# Patient Record
Sex: FEMALE | Employment: FULL TIME | URBAN - METROPOLITAN AREA
[De-identification: names, ages, dates, MRNs, and addresses within clinical notes are randomized per-mention and may not be internally consistent; named-entity substitution may affect disease eponyms.]

---

## 2017-04-14 ENCOUNTER — APPOINTMENT (OUTPATIENT)
Dept: LAB | Facility: HOSPITAL | Age: 47
End: 2017-04-14
Attending: INTERNAL MEDICINE
Payer: COMMERCIAL

## 2017-04-14 DIAGNOSIS — R94.6 ABNORMAL THYROID FUNCTION TEST: ICD-10-CM

## 2017-04-14 PROCEDURE — 84439 ASSAY OF FREE THYROXINE: CPT

## 2017-04-14 PROCEDURE — 84443 ASSAY THYROID STIM HORMONE: CPT

## 2017-04-14 PROCEDURE — 36415 COLL VENOUS BLD VENIPUNCTURE: CPT

## 2017-04-19 NOTE — PROGRESS NOTES
Quick Note:    Results have previously been released without comments/MD recommendations & reviewed by patient, therefore a letter was sent on this day.     No further SST action required.    ______

## 2017-05-05 PROBLEM — S63.641A SPRAIN OF METACARPOPHALANGEAL (MCP) JOINT OF RIGHT THUMB, INITIAL ENCOUNTER: Status: ACTIVE | Noted: 2017-05-05

## 2017-12-04 ENCOUNTER — HOSPITAL ENCOUNTER (OUTPATIENT)
Dept: MAMMOGRAPHY | Facility: HOSPITAL | Age: 47
Discharge: HOME | End: 2017-12-04
Attending: OBSTETRICS & GYNECOLOGY
Payer: COMMERCIAL

## 2017-12-04 DIAGNOSIS — Z12.39 BREAST CANCER SCREENING: ICD-10-CM

## 2018-02-16 PROCEDURE — 87624 HPV HI-RISK TYP POOLED RSLT: CPT | Performed by: OBSTETRICS & GYNECOLOGY

## 2018-02-16 PROCEDURE — 88175 CYTOPATH C/V AUTO FLUID REDO: CPT | Performed by: OBSTETRICS & GYNECOLOGY

## 2018-05-30 ENCOUNTER — LAB ENCOUNTER (OUTPATIENT)
Dept: LAB | Age: 48
End: 2018-05-30
Attending: INTERNAL MEDICINE
Payer: COMMERCIAL

## 2018-05-30 DIAGNOSIS — Z00.00 WELL ADULT EXAM: ICD-10-CM

## 2018-05-30 DIAGNOSIS — E03.9 ACQUIRED HYPOTHYROIDISM: ICD-10-CM

## 2018-05-30 DIAGNOSIS — E78.2 MIXED HYPERLIPIDEMIA: ICD-10-CM

## 2018-05-30 PROCEDURE — 84443 ASSAY THYROID STIM HORMONE: CPT

## 2018-05-30 PROCEDURE — 84439 ASSAY OF FREE THYROXINE: CPT

## 2018-05-30 PROCEDURE — 85025 COMPLETE CBC W/AUTO DIFF WBC: CPT

## 2018-05-30 PROCEDURE — 36415 COLL VENOUS BLD VENIPUNCTURE: CPT

## 2018-05-30 PROCEDURE — 80061 LIPID PANEL: CPT

## 2018-05-30 PROCEDURE — 82306 VITAMIN D 25 HYDROXY: CPT

## 2018-05-30 PROCEDURE — 80053 COMPREHEN METABOLIC PANEL: CPT

## 2018-10-09 ENCOUNTER — HOSPITAL ENCOUNTER (OUTPATIENT)
Dept: MAMMOGRAPHY | Facility: HOSPITAL | Age: 48
Discharge: HOME | End: 2018-10-09
Attending: INTERNAL MEDICINE
Payer: COMMERCIAL

## 2018-10-09 DIAGNOSIS — Z12.39 SCREENING FOR BREAST CANCER: ICD-10-CM

## 2018-10-09 PROCEDURE — 77067 SCR MAMMO BI INCL CAD: CPT | Performed by: OBSTETRICS & GYNECOLOGY

## 2018-10-09 PROCEDURE — 77063 BREAST TOMOSYNTHESIS BI: CPT | Performed by: OBSTETRICS & GYNECOLOGY

## 2018-10-22 ENCOUNTER — HOSPITAL ENCOUNTER (OUTPATIENT)
Dept: CT IMAGING | Facility: HOSPITAL | Age: 48
Discharge: HOME | End: 2018-10-22
Attending: INTERNAL MEDICINE

## 2018-10-22 DIAGNOSIS — Z13.6 SCREENING FOR CARDIOVASCULAR CONDITION: ICD-10-CM

## 2018-11-02 ENCOUNTER — HOSPITAL ENCOUNTER (OUTPATIENT)
Dept: MAMMOGRAPHY | Facility: HOSPITAL | Age: 48
Discharge: HOME | End: 2018-11-02
Attending: OBSTETRICS & GYNECOLOGY
Payer: COMMERCIAL

## 2018-11-02 DIAGNOSIS — R92.2 INCONCLUSIVE MAMMOGRAM: ICD-10-CM

## 2018-11-02 PROCEDURE — 77065 DX MAMMO INCL CAD UNI: CPT | Performed by: OBSTETRICS & GYNECOLOGY

## 2018-11-02 PROCEDURE — 76642 ULTRASOUND BREAST LIMITED: CPT | Performed by: OBSTETRICS & GYNECOLOGY

## 2018-11-02 PROCEDURE — 77061 BREAST TOMOSYNTHESIS UNI: CPT | Performed by: OBSTETRICS & GYNECOLOGY

## 2018-12-07 ENCOUNTER — LAB ENCOUNTER (OUTPATIENT)
Dept: LAB | Age: 48
End: 2018-12-07
Attending: INTERNAL MEDICINE
Payer: COMMERCIAL

## 2018-12-07 DIAGNOSIS — E03.9 HYPOTHYROIDISM, UNSPECIFIED TYPE: ICD-10-CM

## 2018-12-07 PROCEDURE — 36415 COLL VENOUS BLD VENIPUNCTURE: CPT

## 2018-12-07 PROCEDURE — 84443 ASSAY THYROID STIM HORMONE: CPT

## 2018-12-07 PROCEDURE — 84439 ASSAY OF FREE THYROXINE: CPT

## 2018-12-13 NOTE — PROGRESS NOTES
Left detailed message on VM- OK per hipaa- of provider comments below.   Lab already ordered  Rx already escribed to pharmacy

## 2018-12-21 ENCOUNTER — OFFICE VISIT (OUTPATIENT)
Dept: NEUROLOGY | Facility: CLINIC | Age: 48
End: 2018-12-21
Payer: COMMERCIAL

## 2018-12-21 VITALS
HEART RATE: 60 BPM | DIASTOLIC BLOOD PRESSURE: 76 MMHG | BODY MASS INDEX: 31 KG/M2 | SYSTOLIC BLOOD PRESSURE: 120 MMHG | RESPIRATION RATE: 16 BRPM | WEIGHT: 200 LBS

## 2018-12-21 DIAGNOSIS — M54.81 BILATERAL OCCIPITAL NEURALGIA: ICD-10-CM

## 2018-12-21 DIAGNOSIS — G43.709 CHRONIC MIGRAINE W/O AURA W/O STATUS MIGRAINOSUS, NOT INTRACTABLE: ICD-10-CM

## 2018-12-21 DIAGNOSIS — R51.9 NEW ONSET HEADACHE: Primary | ICD-10-CM

## 2018-12-21 PROCEDURE — 99204 OFFICE O/P NEW MOD 45 MIN: CPT | Performed by: OTHER

## 2018-12-21 RX ORDER — PROPRANOLOL HYDROCHLORIDE 80 MG/1
80 CAPSULE, EXTENDED RELEASE ORAL DAILY
Qty: 30 CAPSULE | Refills: 2 | Status: SHIPPED | OUTPATIENT
Start: 2018-12-21 | End: 2019-01-22

## 2018-12-21 NOTE — PROGRESS NOTES
HPI:    Patient ID: Arjun Yanez is a 53year old female. Patient is a 53year old female who presents for evaluation of headache. She has long standing history of migraines and gets them intermittently but they are gradually worsening.  Usually t quittin.9      Smokeless tobacco: Never Used      Tobacco comment: 15 pk yrs    Alcohol use: No      Alcohol/week: 0.0 oz      Comment: 1/mo    Drug use: No           Review of Systems   Constitutional: Negative. HENT: Negative.     Eyes: Positive f normal heart sounds. Pulmonary/Chest: Effort normal and breath sounds normal.   Abdominal: Soft. Bowel sounds are normal.   Skin: Skin is warm and dry. Psychiatric:  normal mood and affect.    Neurological   Awake, alert and oriented to time, place and questions. Thank you for allowing us to participate in your patient's care. Please do not hesitate to call if you have any questions.        Abena Rodriguez MD  [facility]          No orders of the defined types were placed in this

## 2018-12-21 NOTE — PROGRESS NOTES
Patient here for evaluation of migraines. Have been chronic for over 20 years. Have been progressively worse.

## 2019-01-03 ENCOUNTER — TELEPHONE (OUTPATIENT)
Dept: NEUROLOGY | Facility: CLINIC | Age: 49
End: 2019-01-03

## 2019-01-03 DIAGNOSIS — R51.9 NEW ONSET OF HEADACHES: Primary | ICD-10-CM

## 2019-01-03 DIAGNOSIS — M54.81 BILATERAL OCCIPITAL NEURALGIA: ICD-10-CM

## 2019-01-03 DIAGNOSIS — G43.709 CHRONIC MIGRAINE WITHOUT AURA WITHOUT STATUS MIGRAINOSUS, NOT INTRACTABLE: ICD-10-CM

## 2019-01-03 NOTE — TELEPHONE ENCOUNTER
Patient called STEFAN stating she could not complete MRI on 1/2/2019 due to fear of enclosed spaces and having her face covered. Patient requesting IV sedation for MRI. Patient denies wanting po medication for procedure.     Pended order for MRI with sedation

## 2019-01-12 ENCOUNTER — HOSPITAL ENCOUNTER (OUTPATIENT)
Age: 49
Discharge: HOME | End: 2019-01-12
Attending: EMERGENCY MEDICINE
Payer: COMMERCIAL

## 2019-01-12 VITALS
SYSTOLIC BLOOD PRESSURE: 123 MMHG | DIASTOLIC BLOOD PRESSURE: 82 MMHG | HEART RATE: 90 BPM | TEMPERATURE: 99 F | WEIGHT: 195 LBS | BODY MASS INDEX: 31 KG/M2 | RESPIRATION RATE: 16 BRPM | OXYGEN SATURATION: 99 %

## 2019-01-12 DIAGNOSIS — J06.9 VIRAL UPPER RESPIRATORY TRACT INFECTION: Primary | ICD-10-CM

## 2019-01-12 PROCEDURE — 99202 OFFICE O/P NEW SF 15 MIN: CPT

## 2019-01-12 PROCEDURE — 99212 OFFICE O/P EST SF 10 MIN: CPT

## 2019-01-12 RX ORDER — FLUTICASONE PROPIONATE 50 MCG
2 SPRAY, SUSPENSION (ML) NASAL DAILY
Qty: 16 G | Refills: 0 | Status: SHIPPED | OUTPATIENT
Start: 2019-01-12 | End: 2019-02-11

## 2019-01-12 NOTE — ED PROVIDER NOTES
Patient Seen in: [facility] In [address] [place] [address]    History   Patient presents with:  Cough/URI    Stated Complaint: congestion    HPI    Patient here with cough, congestion for 4 days. No travel, no known sick contacts.   Patient denies sig mg total) by mouth daily.        Family History   Problem Relation Age of Onset   • Diabetes Mother    • Hypertension Mother    • Lipids Mother    • Other (hypothyroidism) Mother    • Other (kidney disease) Father    • Ovarian Cancer Maternal Cousin Female (primary encounter diagnosis)    Disposition:  Discharge    Follow-up:  Ian Kwan MD  [address]  [phone]    Schedule an appointment as soon as possible for a visit in 1 week  If symptoms worsen      Medications Prescribed:  C

## 2019-01-22 RX ORDER — PROPRANOLOL HYDROCHLORIDE 80 MG/1
80 TABLET ORAL DAILY
COMMUNITY
End: 2019-02-21

## 2019-01-22 RX ORDER — SODIUM CHLORIDE, SODIUM LACTATE, POTASSIUM CHLORIDE, CALCIUM CHLORIDE 600; 310; 30; 20 MG/100ML; MG/100ML; MG/100ML; MG/100ML
INJECTION, SOLUTION INTRAVENOUS CONTINUOUS
Status: CANCELLED | OUTPATIENT
Start: 2019-01-22

## 2019-01-24 ENCOUNTER — ANESTHESIA EVENT (OUTPATIENT)
Dept: MRI IMAGING | Facility: HOSPITAL | Age: 49
End: 2019-01-24
Payer: COMMERCIAL

## 2019-01-25 ENCOUNTER — ANESTHESIA (OUTPATIENT)
Dept: MRI IMAGING | Facility: HOSPITAL | Age: 49
End: 2019-01-25
Payer: COMMERCIAL

## 2019-01-25 ENCOUNTER — HOSPITAL ENCOUNTER (OUTPATIENT)
Dept: MRI IMAGING | Facility: HOSPITAL | Age: 49
Discharge: HOME | End: 2019-01-25
Attending: Other
Payer: COMMERCIAL

## 2019-01-25 ENCOUNTER — APPOINTMENT (OUTPATIENT)
Dept: LAB | Facility: HOSPITAL | Age: 49
End: 2019-01-25
Attending: Other
Payer: COMMERCIAL

## 2019-01-25 VITALS
BODY MASS INDEX: 30.61 KG/M2 | HEART RATE: 49 BPM | HEIGHT: 67 IN | OXYGEN SATURATION: 100 % | WEIGHT: 195 LBS | TEMPERATURE: 97 F | DIASTOLIC BLOOD PRESSURE: 68 MMHG | RESPIRATION RATE: 16 BRPM | SYSTOLIC BLOOD PRESSURE: 155 MMHG

## 2019-01-25 DIAGNOSIS — R51.9 NEW ONSET OF HEADACHES: ICD-10-CM

## 2019-01-25 DIAGNOSIS — G43.709 CHRONIC MIGRAINE WITHOUT AURA WITHOUT STATUS MIGRAINOSUS, NOT INTRACTABLE: ICD-10-CM

## 2019-01-25 DIAGNOSIS — M54.81 BILATERAL OCCIPITAL NEURALGIA: ICD-10-CM

## 2019-01-25 PROCEDURE — A9575 INJ GADOTERATE MEGLUMI 0.1ML: HCPCS | Performed by: OTHER

## 2019-01-25 PROCEDURE — 70553 MRI BRAIN STEM W/O & W/DYE: CPT | Performed by: OTHER

## 2019-01-25 RX ORDER — ONDANSETRON 2 MG/ML
4 INJECTION INTRAMUSCULAR; INTRAVENOUS AS NEEDED
Status: ACTIVE | OUTPATIENT
Start: 2019-01-25 | End: 2019-01-25

## 2019-01-25 RX ORDER — SODIUM CHLORIDE, SODIUM LACTATE, POTASSIUM CHLORIDE, CALCIUM CHLORIDE 600; 310; 30; 20 MG/100ML; MG/100ML; MG/100ML; MG/100ML
INJECTION, SOLUTION INTRAVENOUS CONTINUOUS
Status: DISCONTINUED | OUTPATIENT
Start: 2019-01-25 | End: 2019-01-27

## 2019-01-25 RX ORDER — NALOXONE HYDROCHLORIDE 0.4 MG/ML
80 INJECTION, SOLUTION INTRAMUSCULAR; INTRAVENOUS; SUBCUTANEOUS AS NEEDED
Status: ACTIVE | OUTPATIENT
Start: 2019-01-25 | End: 2019-01-25

## 2019-01-25 RX ORDER — HYDROMORPHONE HYDROCHLORIDE 1 MG/ML
0.4 INJECTION, SOLUTION INTRAMUSCULAR; INTRAVENOUS; SUBCUTANEOUS EVERY 5 MIN PRN
Status: ACTIVE | OUTPATIENT
Start: 2019-01-25 | End: 2019-01-25

## 2019-01-25 RX ORDER — METOCLOPRAMIDE HYDROCHLORIDE 5 MG/ML
10 INJECTION INTRAMUSCULAR; INTRAVENOUS AS NEEDED
Status: ACTIVE | OUTPATIENT
Start: 2019-01-25 | End: 2019-01-25

## 2019-01-25 RX ORDER — DEXAMETHASONE SODIUM PHOSPHATE 4 MG/ML
4 VIAL (ML) INJECTION AS NEEDED
Status: ACTIVE | OUTPATIENT
Start: 2019-01-25 | End: 2019-01-25

## 2019-01-25 NOTE — ANESTHESIA PREPROCEDURE EVALUATION
PRE-OP EVALUATION    Patient Name: Selina Rome    Pre-op Diagnosis: * No pre-op diagnosis entered *    * No procedures listed *    * No surgeons found in log *    Pre-op vitals reviewed. Body mass index is 30.54 kg/m².     Current medications r 2/3/2011    Performed by Pardeep Hernandez MD at [address] [place] [address]   • OTHER SURGICAL HISTORY Bilateral 1980's    knee surgery - scope - ?? findings   • TUBAL LIGATION  1999    Post partum after last preg     Social History    Tobacco Use      Smok

## 2019-01-25 NOTE — ANESTHESIA POSTPROCEDURE EVALUATION
[address] [facility] Patient Status:  Outpatient   Age/Gender 53year old female MRN [id number]   [facility] MRI Attending Yanet Moncada MD   Hosp Day # 0 PCP Kuldeep Miranda MD       Anesthesia Post-op Note    * No procedur

## 2019-01-28 ENCOUNTER — TELEPHONE (OUTPATIENT)
Dept: NEUROLOGY | Facility: CLINIC | Age: 49
End: 2019-01-28

## 2019-01-28 NOTE — TELEPHONE ENCOUNTER
Spoke with patient and relayed MRI results below from Dr. Iglesia Rico. Also discussed need to schedule f/u appt to further discuss treatment plan. Pt will c/b tomorrow when she has access to her calendar.   Answered all questions and pt was encouraged to mark

## 2019-02-15 ENCOUNTER — TELEPHONE (OUTPATIENT)
Dept: NEUROLOGY | Facility: CLINIC | Age: 49
End: 2019-02-15

## 2019-02-15 NOTE — TELEPHONE ENCOUNTER
Spoke with Lindsay at pharmacy. States they did receive the 80mg dosing. Pt asking for #90 day. There is only a 30 day supply left in their system. She was due back in January for follow up.  She will fill 30 day and ask pt to call office Monday to set up ap

## 2019-02-21 ENCOUNTER — TELEPHONE (OUTPATIENT)
Dept: NEUROLOGY | Facility: CLINIC | Age: 49
End: 2019-02-21

## 2019-02-21 DIAGNOSIS — G43.111 INTRACTABLE MIGRAINE WITH AURA WITH STATUS MIGRAINOSUS: Primary | ICD-10-CM

## 2019-02-21 NOTE — TELEPHONE ENCOUNTER
Pended #90 day for review. Does have upcoming appt 3/4/19 but insurance only covering a #90 day of medication.

## 2019-02-22 RX ORDER — PROPRANOLOL HYDROCHLORIDE 80 MG/1
80 TABLET ORAL DAILY
Qty: 90 TABLET | Refills: 0 | Status: SHIPPED | OUTPATIENT
Start: 2019-02-22 | End: 2019-02-27 | Stop reason: CLARIF

## 2019-02-26 NOTE — TELEPHONE ENCOUNTER
Pharmacy faxing and asking for clarification. Previous Rx for Propranolol have been for ER (60 mg) version. LOV patient increased dose to 80 mg. Need clarification on if provider would like ER or IR version of Propranolol. Routed to provider.

## 2019-02-27 RX ORDER — PROPRANOLOL HYDROCHLORIDE 80 MG/1
80 CAPSULE, EXTENDED RELEASE ORAL DAILY
Qty: 90 CAPSULE | Refills: 0 | Status: SHIPPED | OUTPATIENT
Start: 2019-02-27 | End: 2019-03-04

## 2019-02-27 NOTE — TELEPHONE ENCOUNTER
Called pharmacy and cancelled 80 mg propranolol 80 mg IR. Pharmacist requested new written order for 80 mg ER daily. Ordered and sent to patient's preferred pharmacy.

## 2019-03-04 ENCOUNTER — OFFICE VISIT (OUTPATIENT)
Dept: NEUROLOGY | Facility: CLINIC | Age: 49
End: 2019-03-04
Payer: COMMERCIAL

## 2019-03-04 VITALS
BODY MASS INDEX: 31 KG/M2 | RESPIRATION RATE: 18 BRPM | SYSTOLIC BLOOD PRESSURE: 118 MMHG | WEIGHT: 200 LBS | HEART RATE: 72 BPM | DIASTOLIC BLOOD PRESSURE: 70 MMHG

## 2019-03-04 DIAGNOSIS — M54.81 BILATERAL OCCIPITAL NEURALGIA: ICD-10-CM

## 2019-03-04 DIAGNOSIS — G43.709 CHRONIC MIGRAINE WITHOUT AURA WITHOUT STATUS MIGRAINOSUS, NOT INTRACTABLE: Primary | ICD-10-CM

## 2019-03-04 PROCEDURE — 99213 OFFICE O/P EST LOW 20 MIN: CPT | Performed by: OTHER

## 2019-03-04 RX ORDER — PROPRANOLOL HYDROCHLORIDE 80 MG/1
80 CAPSULE, EXTENDED RELEASE ORAL DAILY
Qty: 90 CAPSULE | Refills: 2 | Status: SHIPPED | OUTPATIENT
Start: 2019-03-04 | End: 2019-06-02

## 2019-03-04 NOTE — PROGRESS NOTES
The patient is here for a follow-up for headaches. The patient states she is having a headache everyday. The patient would like to go over her MRI results.

## 2019-03-04 NOTE — PROGRESS NOTES
HPI:    Patient ID: Kehinde Malone is a 53year old female. Follow up visit  Patient reports headaches remains the same. She is seeing ENT and getting treatment for sinus disease as current headaches are more due to sinus disease.  MRI brain w and w History   Problem Relation Age of Onset   • Diabetes Mother    • Hypertension Mother    • Lipids Mother    • Other (hypothyroidism) Mother    • Other (kidney disease) Father    • Ovarian Cancer Maternal Cousin Female 48      Social History    Tobacco Use Tab 1-2 tabs as directed asap migraine headache, repeat x 1 @ 2h if needed. Disp: 9 tablet Rfl: 3     Allergies:  Pcn [Penicillins]       RASH   PHYSICAL EXAM:   Physical Exam    Blood pressure 118/70, pulse 72, resp.  rate 18, weight 200 lb, not currently [id number]    HPI

## 2019-08-16 ENCOUNTER — HOSPITAL ENCOUNTER (OUTPATIENT)
Dept: ULTRASOUND IMAGING | Age: 49
Discharge: HOME | End: 2019-08-16
Attending: INTERNAL MEDICINE
Payer: COMMERCIAL

## 2019-08-16 DIAGNOSIS — R10.10 UPPER ABDOMINAL PAIN: ICD-10-CM

## 2019-08-16 PROCEDURE — 76700 US EXAM ABDOM COMPLETE: CPT | Performed by: INTERNAL MEDICINE

## 2019-08-27 ENCOUNTER — APPOINTMENT (OUTPATIENT)
Dept: LAB | Facility: HOSPITAL | Age: 49
End: 2019-08-27
Attending: INTERNAL MEDICINE
Payer: COMMERCIAL

## 2019-08-27 DIAGNOSIS — E03.9 ACQUIRED HYPOTHYROIDISM: ICD-10-CM

## 2019-08-27 LAB
T4 FREE SERPL-MCNC: 1.2 NG/DL (ref 0.8–1.7)
TSI SER-ACNC: 0.67 MIU/ML (ref 0.36–3.74)

## 2019-08-27 PROCEDURE — 84443 ASSAY THYROID STIM HORMONE: CPT

## 2019-08-27 PROCEDURE — 36415 COLL VENOUS BLD VENIPUNCTURE: CPT

## 2019-08-27 PROCEDURE — 84439 ASSAY OF FREE THYROXINE: CPT

## 2019-10-29 ENCOUNTER — HOSPITAL ENCOUNTER (OUTPATIENT)
Dept: CT IMAGING | Facility: HOSPITAL | Age: 49
Discharge: HOME | End: 2019-10-29
Attending: INTERNAL MEDICINE
Payer: COMMERCIAL

## 2019-10-29 DIAGNOSIS — R10.12 LUQ PAIN: ICD-10-CM

## 2019-10-29 PROCEDURE — 82565 ASSAY OF CREATININE: CPT

## 2019-10-29 PROCEDURE — 74177 CT ABD & PELVIS W/CONTRAST: CPT | Performed by: INTERNAL MEDICINE

## 2019-11-08 ENCOUNTER — LAB ENCOUNTER (OUTPATIENT)
Dept: LAB | Facility: HOSPITAL | Age: 49
End: 2019-11-08
Attending: INTERNAL MEDICINE
Payer: COMMERCIAL

## 2019-11-08 DIAGNOSIS — R12 CHRONIC HEARTBURN: ICD-10-CM

## 2019-11-08 DIAGNOSIS — R10.12 LUQ PAIN: ICD-10-CM

## 2019-11-08 PROCEDURE — 80053 COMPREHEN METABOLIC PANEL: CPT

## 2019-11-08 PROCEDURE — 86140 C-REACTIVE PROTEIN: CPT

## 2019-11-08 PROCEDURE — 85025 COMPLETE CBC W/AUTO DIFF WBC: CPT

## 2019-11-08 PROCEDURE — 36415 COLL VENOUS BLD VENIPUNCTURE: CPT

## 2019-11-11 PROBLEM — K76.0 FATTY LIVER: Status: ACTIVE | Noted: 2019-11-11

## 2019-11-11 PROBLEM — E66.9 OBESITY (BMI 30-39.9): Status: ACTIVE | Noted: 2019-11-11

## 2019-11-17 NOTE — PROGRESS NOTES
Results reviewed. Released to [address] Mildly elevated CRP, creatinine and blood glucose. Ct abdomen/pelvis negative.  EGD scheduled 12/26  F/u with PCP for creatinine and elevated BG> sg

## 2019-12-03 ENCOUNTER — LAB ENCOUNTER (OUTPATIENT)
Dept: LAB | Facility: HOSPITAL | Age: 49
End: 2019-12-03
Attending: INTERNAL MEDICINE
Payer: COMMERCIAL

## 2019-12-03 DIAGNOSIS — E03.9 HYPOTHYROIDISM, UNSPECIFIED TYPE: ICD-10-CM

## 2019-12-03 DIAGNOSIS — Z00.00 WELL ADULT EXAM: ICD-10-CM

## 2019-12-03 DIAGNOSIS — E66.9 OBESITY (BMI 30-39.9): ICD-10-CM

## 2019-12-03 PROCEDURE — 85025 COMPLETE CBC W/AUTO DIFF WBC: CPT

## 2019-12-03 PROCEDURE — 36415 COLL VENOUS BLD VENIPUNCTURE: CPT

## 2019-12-03 PROCEDURE — 82397 CHEMILUMINESCENT ASSAY: CPT

## 2019-12-03 PROCEDURE — 84439 ASSAY OF FREE THYROXINE: CPT

## 2019-12-03 PROCEDURE — 84443 ASSAY THYROID STIM HORMONE: CPT

## 2019-12-03 PROCEDURE — 82306 VITAMIN D 25 HYDROXY: CPT

## 2019-12-03 PROCEDURE — 80061 LIPID PANEL: CPT

## 2019-12-03 PROCEDURE — 80053 COMPREHEN METABOLIC PANEL: CPT

## 2019-12-03 PROCEDURE — 83525 ASSAY OF INSULIN: CPT

## 2019-12-10 NOTE — PROGRESS NOTES
[employer] message sent to patient. Pt checks MC per Epic  Nothing further needed from MD or nurse. Closing encounter.

## 2019-12-13 ENCOUNTER — OFFICE VISIT (OUTPATIENT)
Dept: SLEEP CENTER | Age: 49
End: 2019-12-13
Attending: INTERNAL MEDICINE
Payer: COMMERCIAL

## 2019-12-13 DIAGNOSIS — G47.8 UNREFRESHED BY SLEEP: ICD-10-CM

## 2019-12-13 DIAGNOSIS — R06.83 SNORING: ICD-10-CM

## 2019-12-13 PROCEDURE — 95810 POLYSOM 6/> YRS 4/> PARAM: CPT

## 2019-12-17 NOTE — PROCEDURES
[address] [address]       Accredited by the [employer] (AASM)    PATIENT'S NAME:        Madi Coy  ATTENDING PHYSICIAN:   Chyna George M.D. REFERRING PHYSICIAN:   Sofya Cuello M.D.   PAT movements were not observed. EEG:  With the limited montage recorded, no EEG abnormalities were observed. IMPRESSION:  This study, along with the clinical history, is consistent with obstructive sleep apnea-hypopnea syndrome.     RECOMMENDATIONS:  The

## 2020-01-08 PROBLEM — K29.00 ACUTE SUPERFICIAL GASTRITIS WITHOUT HEMORRHAGE: Status: ACTIVE | Noted: 2020-01-08

## 2020-01-09 ENCOUNTER — HOSPITAL ENCOUNTER (OUTPATIENT)
Dept: MAMMOGRAPHY | Facility: HOSPITAL | Age: 50
Discharge: HOME | End: 2020-01-09
Attending: OBSTETRICS & GYNECOLOGY
Payer: COMMERCIAL

## 2020-01-09 DIAGNOSIS — Z12.39 SCREENING FOR BREAST CANCER: ICD-10-CM

## 2020-01-09 PROCEDURE — 77063 BREAST TOMOSYNTHESIS BI: CPT | Performed by: OBSTETRICS & GYNECOLOGY

## 2020-01-09 PROCEDURE — 77067 SCR MAMMO BI INCL CAD: CPT | Performed by: OBSTETRICS & GYNECOLOGY

## 2020-01-13 ENCOUNTER — OFFICE VISIT (OUTPATIENT)
Dept: NEUROLOGY | Facility: CLINIC | Age: 50
End: 2020-01-13
Payer: COMMERCIAL

## 2020-01-13 ENCOUNTER — TELEPHONE (OUTPATIENT)
Dept: NEUROLOGY | Facility: CLINIC | Age: 50
End: 2020-01-13

## 2020-01-13 VITALS
WEIGHT: 210 LBS | BODY MASS INDEX: 33 KG/M2 | HEART RATE: 70 BPM | SYSTOLIC BLOOD PRESSURE: 120 MMHG | RESPIRATION RATE: 16 BRPM | DIASTOLIC BLOOD PRESSURE: 68 MMHG

## 2020-01-13 DIAGNOSIS — G43.709 CHRONIC MIGRAINE WITHOUT AURA WITHOUT STATUS MIGRAINOSUS, NOT INTRACTABLE: Primary | ICD-10-CM

## 2020-01-13 PROCEDURE — 99213 OFFICE O/P EST LOW 20 MIN: CPT | Performed by: OTHER

## 2020-01-13 NOTE — TELEPHONE ENCOUNTER
Pt to start Deon hall Per Dr Garnica Virgil. PA questions asked and referral placed to PA team.     Aimovig instructions given as well. Verbalized understanding, no further questions.

## 2020-01-13 NOTE — PROGRESS NOTES
HPI:    Patient ID: Ninfa Osman is a 51year old female. Follow up visit  Patient presents for follow up for migraines. She states headaches have increase in the past 6 months.  She had a procedure done by ENT and got treatment for possible sinus Disorder Mother    • Other (hypothyroidism) Mother    • Heart Attack Father    • Other (kidney disease) Father    • Ovarian Cancer Maternal Cousin Female 48      Social History    Tobacco Use      Smoking status: Former Smoker        Packs/day: 1.00 ALLERGY/SINUS) 2.65 % Nasal Solution 1 spray by Nasal route 3 (three) times daily. 50 mL 0   • SUMAtriptan Succinate (IMITREX) 50 MG Oral Tab 1-2 tabs as directed asap migraine headache, repeat x 1 @ 2h if needed.  9 tablet 3   • Aspirin-Acetaminophen-Caffe orders of the defined types were placed in this encounter.       Meds This Visit:  Requested Prescriptions      No prescriptions requested or ordered in this encounter       Imaging & Referrals:  None       ID#[id number]    Migraine    Associated symptoms include

## 2020-01-17 ENCOUNTER — HOSPITAL ENCOUNTER (OUTPATIENT)
Dept: MAMMOGRAPHY | Facility: HOSPITAL | Age: 50
Discharge: HOME | End: 2020-01-17
Attending: OBSTETRICS & GYNECOLOGY
Payer: COMMERCIAL

## 2020-01-17 DIAGNOSIS — R92.2 INCONCLUSIVE MAMMOGRAM: ICD-10-CM

## 2020-01-17 PROCEDURE — 76642 ULTRASOUND BREAST LIMITED: CPT | Performed by: OBSTETRICS & GYNECOLOGY

## 2020-01-17 PROCEDURE — 77061 BREAST TOMOSYNTHESIS UNI: CPT | Performed by: OBSTETRICS & GYNECOLOGY

## 2020-01-17 PROCEDURE — 77065 DX MAMMO INCL CAD UNI: CPT | Performed by: OBSTETRICS & GYNECOLOGY

## 2020-01-20 NOTE — TELEPHONE ENCOUNTER
Spoke with pt, informed her of PA approval. Pt expressed understanding and was encouraged to call office with further questions or concerns.

## 2020-01-28 ENCOUNTER — OFFICE VISIT (OUTPATIENT)
Dept: SLEEP CENTER | Age: 50
End: 2020-01-28
Attending: INTERNAL MEDICINE
Payer: COMMERCIAL

## 2020-01-28 DIAGNOSIS — R53.83 FATIGUE, UNSPECIFIED TYPE: ICD-10-CM

## 2020-01-28 DIAGNOSIS — G47.33 OSA (OBSTRUCTIVE SLEEP APNEA): ICD-10-CM

## 2020-01-28 PROCEDURE — 95811 POLYSOM 6/>YRS CPAP 4/> PARM: CPT

## 2020-01-30 NOTE — PROCEDURES
[address] [address]       Accredited by the [employer] of Sleep Medicine (AASM)    PATIENT'S NAME:        Scarlet Mateusz  ATTENDING PHYSICIAN:   Janae Matute M.D.   REFERRING PHYSICIAN:   Janae Matute M.D. saturation arabella was 93%. ECG:  ECG demonstrated sinus rhythm throughout. PERIODIC LIMB MOVEMENTS:  The periodic limb movement index was 25.8 and the periodic limb movement with arousal index was 6.3.     EEG:  With the limited montage recorded, no EE

## 2020-02-10 PROBLEM — E88.81 METABOLIC SYNDROME: Status: ACTIVE | Noted: 2020-02-10

## 2020-02-10 PROBLEM — E88.810 METABOLIC SYNDROME: Status: ACTIVE | Noted: 2020-02-10

## 2020-02-24 ENCOUNTER — OFFICE VISIT (OUTPATIENT)
Dept: NEUROLOGY | Facility: CLINIC | Age: 50
End: 2020-02-24
Payer: COMMERCIAL

## 2020-02-24 VITALS
WEIGHT: 195 LBS | DIASTOLIC BLOOD PRESSURE: 74 MMHG | RESPIRATION RATE: 16 BRPM | HEART RATE: 70 BPM | BODY MASS INDEX: 31 KG/M2 | SYSTOLIC BLOOD PRESSURE: 120 MMHG

## 2020-02-24 DIAGNOSIS — G43.709 CHRONIC MIGRAINE WITHOUT AURA WITHOUT STATUS MIGRAINOSUS, NOT INTRACTABLE: Primary | ICD-10-CM

## 2020-02-24 PROCEDURE — 99213 OFFICE O/P EST LOW 20 MIN: CPT | Performed by: OTHER

## 2020-02-24 RX ORDER — PROPRANOLOL HCL 60 MG
60 CAPSULE, EXTENDED RELEASE 24HR ORAL DAILY
Qty: 30 CAPSULE | Refills: 2 | Status: SHIPPED | OUTPATIENT
Start: 2020-02-24 | End: 2020-03-25

## 2020-02-24 NOTE — PROGRESS NOTES
HPI:    Patient ID: Sadie Godfrey is a 51year old female. Follow up visit  Patient presents for follow up for migraines. States since starting Aimovig headache have gone down to once a week. She is tolerating fine except mild constipation.  No oth Hypertension Mother    • Lipids Mother    • Heart Attack Mother    • Stroke Mother    • Mental Disorder Mother    • Other (hypothyroidism) Mother    • Heart Attack Father    • Other (kidney disease) Father    • Ovarian Cancer Maternal Cousin Female 48 atorvastatin 40 MG Oral Tab Take 1 tablet (40 mg total) by mouth once daily. 90 tablet 3   • Aspirin-Acetaminophen-Caffeine (EXCEDRIN MIGRAINE OR) Take by mouth as needed.      • Saline (AYR NASAL MIST ALLERGY/SINUS) 2.65 % Nasal Solution 1 spray by Nasal r indicates understanding of these issues and agrees with the plan. Mary Carmen Tejeda MD  [facility]          No orders of the defined types were placed in this encounter.       Meds This Visit:  Requested Prescriptions     Signed Pr

## 2020-04-17 RX ORDER — ERENUMAB-AOOE 70 MG/ML
INJECTION SUBCUTANEOUS
Qty: 1 PEN | Refills: 2 | Status: SHIPPED | OUTPATIENT
Start: 2020-04-17 | End: 2020-08-05

## 2020-04-17 NOTE — TELEPHONE ENCOUNTER
Medication: Aimovig    Date of last refill: 1/13/2020 (#1/2)  [place]     Last office visit: 2/24/2020  Due back to clinic per last office note: 3 months  Date next office visit scheduled:    Future Appointments   Date T

## 2020-04-22 ENCOUNTER — TELEPHONE (OUTPATIENT)
Dept: NEUROLOGY | Facility: CLINIC | Age: 50
End: 2020-04-22

## 2020-04-22 DIAGNOSIS — G43.709 CHRONIC MIGRAINE WITHOUT AURA WITHOUT STATUS MIGRAINOSUS, NOT INTRACTABLE: Primary | ICD-10-CM

## 2020-04-22 NOTE — TELEPHONE ENCOUNTER
Incoming fax asking for re-auth for Alda Collazo. Per Epic review:   Last PA done 1/13/2020  -approved 1/15/2020-4/15/2020    Active on [employer]. Sent message with re-auth questions.

## 2020-05-11 ENCOUNTER — APPOINTMENT (OUTPATIENT)
Dept: LAB | Age: 50
End: 2020-05-11
Attending: INTERNAL MEDICINE
Payer: COMMERCIAL

## 2020-05-11 DIAGNOSIS — E55.9 VITAMIN D DEFICIENCY: ICD-10-CM

## 2020-05-11 PROCEDURE — 82306 VITAMIN D 25 HYDROXY: CPT

## 2020-05-11 PROCEDURE — 36415 COLL VENOUS BLD VENIPUNCTURE: CPT

## 2020-05-15 ENCOUNTER — VIRTUAL PHONE E/M (OUTPATIENT)
Dept: NEUROLOGY | Facility: CLINIC | Age: 50
End: 2020-05-15
Payer: COMMERCIAL

## 2020-05-15 DIAGNOSIS — G43.709 CHRONIC MIGRAINE WITHOUT AURA WITHOUT STATUS MIGRAINOSUS, NOT INTRACTABLE: Primary | ICD-10-CM

## 2020-05-15 PROCEDURE — 99213 OFFICE O/P EST LOW 20 MIN: CPT | Performed by: OTHER

## 2020-05-15 NOTE — PROGRESS NOTES
HPI:    Patient ID: Ramon Rodriguez is a 53year old female. Virtual/Telephone Check-In    Ramon Rodriguez verbally consents a Virtual/Telephone Check-In service on 05/15/20.   Patient understands and accepts financial responsibility for any deductib partum after last preg      Family History   Problem Relation Age of Onset   • Diabetes Mother    • Hypertension Mother    • Lipids Mother    • Heart Attack Mother    • Stroke Mother    • Mental Disorder Mother    • Other (hypothyroidism) Mother    • Heart • Aspirin-Acetaminophen-Caffeine (EXCEDRIN MIGRAINE OR) Take by mouth as needed. • Saline (AYR NASAL MIST ALLERGY/SINUS) 2.65 % Nasal Solution 1 spray by Nasal route 3 (three) times daily.  50 mL 0   • SUMAtriptan Succinate (IMITREX) 50 MG Oral Tab 1-

## 2020-05-19 NOTE — TELEPHONE ENCOUNTER
STEFAN other placed for aimovig reauth    Reauthorization Questions for Aimovig 70m. Has the patient experienced a reduction of at least 7 headache days or 100 hours per month since starting medication?  yes       2. If yes, by what percentage?   80%

## 2020-05-26 ENCOUNTER — TELEPHONE (OUTPATIENT)
Dept: NEUROLOGY | Facility: CLINIC | Age: 50
End: 2020-05-26

## 2020-06-25 DIAGNOSIS — Z78.9 PARTICIPANT IN HEALTH AND WELLNESS PLAN: Primary | ICD-10-CM

## 2020-07-06 ENCOUNTER — NURSE ONLY (OUTPATIENT)
Dept: LAB | Age: 50
End: 2020-07-06
Attending: PREVENTIVE MEDICINE

## 2020-07-06 DIAGNOSIS — Z78.9 PARTICIPANT IN HEALTH AND WELLNESS PLAN: ICD-10-CM

## 2020-07-06 PROCEDURE — 86769 SARS-COV-2 COVID-19 ANTIBODY: CPT

## 2020-07-07 LAB — SARS-COV-2 IGG SERPLBLD QL IA.RAPID: NEGATIVE

## 2020-07-21 ENCOUNTER — HOSPITAL ENCOUNTER (OUTPATIENT)
Dept: MAMMOGRAPHY | Facility: HOSPITAL | Age: 50
Discharge: HOME | End: 2020-07-21
Attending: OBSTETRICS & GYNECOLOGY
Payer: COMMERCIAL

## 2020-07-21 DIAGNOSIS — R92.8 ABNORMAL MAMMOGRAM: ICD-10-CM

## 2020-07-21 PROCEDURE — 77065 DX MAMMO INCL CAD UNI: CPT | Performed by: OBSTETRICS & GYNECOLOGY

## 2020-07-21 PROCEDURE — 77061 BREAST TOMOSYNTHESIS UNI: CPT | Performed by: OBSTETRICS & GYNECOLOGY

## 2020-07-21 PROCEDURE — 76642 ULTRASOUND BREAST LIMITED: CPT | Performed by: OBSTETRICS & GYNECOLOGY

## 2020-08-05 ENCOUNTER — APPOINTMENT (OUTPATIENT)
Dept: LAB | Facility: HOSPITAL | Age: 50
End: 2020-08-05
Attending: INTERNAL MEDICINE
Payer: COMMERCIAL

## 2020-08-05 DIAGNOSIS — E55.9 VITAMIN D DEFICIENCY: ICD-10-CM

## 2020-08-05 DIAGNOSIS — G43.709 CHRONIC MIGRAINE WITHOUT AURA WITHOUT STATUS MIGRAINOSUS, NOT INTRACTABLE: Primary | ICD-10-CM

## 2020-08-05 PROCEDURE — 82306 VITAMIN D 25 HYDROXY: CPT

## 2020-08-05 PROCEDURE — 36415 COLL VENOUS BLD VENIPUNCTURE: CPT

## 2020-08-05 RX ORDER — ERENUMAB-AOOE 70 MG/ML
INJECTION SUBCUTANEOUS
Qty: 1 PEN | Refills: 2 | Status: SHIPPED | OUTPATIENT
Start: 2020-08-05 | End: 2020-11-02

## 2020-08-05 NOTE — TELEPHONE ENCOUNTER
Medication: AIMOVIG 70 MG/ML Subcutaneous    Date of last refill: 04/17/2020 (#1 pen/2)  [place]    Last office visit: 2/24/2020  Due back to clinic per last office note:  4-5 months  Date next office visit scheduled

## 2020-08-07 LAB — 25(OH)D3 SERPL-MCNC: 43.1 NG/ML (ref 30–100)

## 2020-09-16 ENCOUNTER — OFFICE VISIT (OUTPATIENT)
Dept: NEUROLOGY | Facility: CLINIC | Age: 50
End: 2020-09-16
Payer: COMMERCIAL

## 2020-09-16 ENCOUNTER — TELEPHONE (OUTPATIENT)
Dept: NEUROLOGY | Facility: CLINIC | Age: 50
End: 2020-09-16

## 2020-09-16 VITALS
WEIGHT: 192 LBS | HEART RATE: 72 BPM | BODY MASS INDEX: 30 KG/M2 | SYSTOLIC BLOOD PRESSURE: 124 MMHG | DIASTOLIC BLOOD PRESSURE: 70 MMHG | RESPIRATION RATE: 16 BRPM

## 2020-09-16 DIAGNOSIS — G43.111 INTRACTABLE MIGRAINE WITH AURA WITH STATUS MIGRAINOSUS: ICD-10-CM

## 2020-09-16 DIAGNOSIS — G43.709 CHRONIC MIGRAINE WITHOUT AURA WITHOUT STATUS MIGRAINOSUS, NOT INTRACTABLE: Primary | ICD-10-CM

## 2020-09-16 PROCEDURE — 99213 OFFICE O/P EST LOW 20 MIN: CPT | Performed by: OTHER

## 2020-09-16 PROCEDURE — 3074F SYST BP LT 130 MM HG: CPT | Performed by: OTHER

## 2020-09-16 PROCEDURE — 3078F DIAST BP <80 MM HG: CPT | Performed by: OTHER

## 2020-09-16 NOTE — PROGRESS NOTES
Patient states she is not having migraines. Patient states increase constipation. Patient states she tired miralax and colace, as well as a fiber drink.

## 2020-09-16 NOTE — PROGRESS NOTES
HPI:    Patient ID: Jorge L Granados is a 53year old female. PCP: Dr Ariadna Dyer    Follow up visit  Patient presents for follow up for migraine headache. She is on Aimovig and Depakote for migraine prevention.   Patient reports Yuli Matt has been very effectiv Diabetes Mother    • Hypertension Mother    • Lipids Mother    • Heart Attack Mother    • Stroke Mother    • Mental Disorder Mother    • Other (hypothyroidism) Mother    • Heart Attack Father    • Other (kidney disease) Father    • Ovarian Cancer Maternal 90 tablet 3   • atorvastatin 40 MG Oral Tab Take 1 tablet (40 mg total) by mouth once daily. 90 tablet 3     Allergies:  Pcn [Penicillins]       RASH   PHYSICAL EXAM:   Physical Exam    Blood pressure 124/70, pulse 72, resp.  rate 16, weight 192 lb (87.1 kg Visit:  Requested Prescriptions      No prescriptions requested or ordered in this encounter       Imaging & Referrals:  None       ID#[id number]    Migraine    Pertinent negatives include no dizziness or neck pain.

## 2020-10-21 ENCOUNTER — PATIENT MESSAGE (OUTPATIENT)
Dept: NEUROLOGY | Facility: CLINIC | Age: 50
End: 2020-10-21

## 2020-10-21 DIAGNOSIS — G43.709 CHRONIC MIGRAINE WITHOUT AURA WITHOUT STATUS MIGRAINOSUS, NOT INTRACTABLE: Primary | ICD-10-CM

## 2020-10-21 DIAGNOSIS — G43.111 INTRACTABLE MIGRAINE WITH AURA WITH STATUS MIGRAINOSUS: ICD-10-CM

## 2020-10-21 RX ORDER — GALCANEZUMAB 120 MG/ML
INJECTION, SOLUTION SUBCUTANEOUS
Qty: 2 PEN | Refills: 0 | Status: SHIPPED | OUTPATIENT
Start: 2020-10-21 | End: 2020-10-30

## 2020-10-21 NOTE — TELEPHONE ENCOUNTER
From: Paulina Godoy  To:  Kassy Will MD  Sent: 10/21/2020 2:38 PM CDT  Subject: Prescription Question    I need a script sent to my pharmacy for the [facility] - I did get a letter that it has been approved by my insurance-

## 2020-10-21 NOTE — TELEPHONE ENCOUNTER
Rx Emgality 120mg #2 loading dose sent to [facility] per written order. Patient notified via [employer] and given instructions regarding administration and patient to call at least 72 hrs before refill needed.

## 2020-10-30 ENCOUNTER — TELEPHONE (OUTPATIENT)
Dept: NEUROLOGY | Facility: CLINIC | Age: 50
End: 2020-10-30

## 2020-10-30 DIAGNOSIS — G43.111 INTRACTABLE MIGRAINE WITH AURA WITH STATUS MIGRAINOSUS: ICD-10-CM

## 2020-10-30 DIAGNOSIS — G43.709 CHRONIC MIGRAINE WITHOUT AURA WITHOUT STATUS MIGRAINOSUS, NOT INTRACTABLE: ICD-10-CM

## 2020-10-30 RX ORDER — GALCANEZUMAB 120 MG/ML
INJECTION, SOLUTION SUBCUTANEOUS
Qty: 2 PEN | Refills: 0 | COMMUNITY
Start: 2020-10-30 | End: 2020-11-02

## 2020-10-30 NOTE — TELEPHONE ENCOUNTER
Patient states she is having difficulty acquiring the Emgality loading dose. Patient states Rx Emgality was originally sent to [facility] but Rx was transferred to [place] in [place] due to insurance.  Patient states Aayush told her they tried to run the

## 2020-11-02 ENCOUNTER — TELEPHONE (OUTPATIENT)
Dept: NEUROLOGY | Facility: CLINIC | Age: 50
End: 2020-11-02

## 2020-11-02 RX ORDER — GALCANEZUMAB 120 MG/ML
INJECTION, SOLUTION SUBCUTANEOUS
Qty: 2 PEN | Refills: 0 | COMMUNITY
Start: 2020-11-02 | End: 2020-11-17

## 2020-11-02 NOTE — TELEPHONE ENCOUNTER
Pt insurance company called, states that pt called them regarding not being adria to  loading dose d/t insurance.     Spoke with patient, pt verbalized that she had to transfer script from [facility] to [place], where she still was unable to  [employer]

## 2020-11-02 NOTE — TELEPHONE ENCOUNTER
Refused. Duplicate.        Medication: EMGALITY 120 MG/ML Subcutaneous Solution Auto-injector    Date of last refill: 11/02/2020 (#2/0)  [place]    Last office visit: 09/16/2020  Due back to clinic per last office not

## 2020-11-02 NOTE — TELEPHONE ENCOUNTER
Spoke with Manisha Gill RN who states that patient she has been unable to fill RX loading dose. Per notes below, Yolanda Stallworth was going to discuss with pharmacy.     Called Yolanda Phillip and requested she contact pharmacy and update STEFAN as to how to get this patient

## 2020-11-04 RX ORDER — GALCANEZUMAB 120 MG/ML
INJECTION, SOLUTION SUBCUTANEOUS
Qty: 2 ML | Refills: 0 | OUTPATIENT
Start: 2020-11-04

## 2020-11-16 NOTE — TELEPHONE ENCOUNTER
Per PSR: Refill Request (EMGALITY 120 MG/ML Subcutaneous Solution Auto-injector), Refill Request (Pt needs the emgality sent to [facility]      Medication: EMGALITY 120 MG/ML Subcutaneous Solution Auto-injector     Date of last refill: 11/02/2020 (#2/0)  D

## 2020-11-17 RX ORDER — GALCANEZUMAB 120 MG/ML
INJECTION, SOLUTION SUBCUTANEOUS
Qty: 2 PEN | Refills: 0 | Status: SHIPPED | OUTPATIENT
Start: 2020-11-17 | End: 2020-11-20

## 2020-11-20 ENCOUNTER — TELEPHONE (OUTPATIENT)
Dept: NEUROLOGY | Facility: CLINIC | Age: 50
End: 2020-11-20

## 2020-11-20 DIAGNOSIS — G43.709 CHRONIC MIGRAINE WITHOUT AURA WITHOUT STATUS MIGRAINOSUS, NOT INTRACTABLE: ICD-10-CM

## 2020-11-20 RX ORDER — GALCANEZUMAB 120 MG/ML
INJECTION, SOLUTION SUBCUTANEOUS
Qty: 3 PEN | Refills: 1 | COMMUNITY
Start: 2020-11-20 | End: 2021-04-14

## 2020-11-20 NOTE — TELEPHONE ENCOUNTER
Attempted to call [facility] back and was on hold back and forth with [facility] reps for over 36:44 min. Per Epic review, Emgality approved from 09/25/2020 to 03/25/2021    RX for loading dose was accidentally sent again to [address] [place] [address] for #2/0.   This ac

## 2020-11-23 NOTE — TELEPHONE ENCOUNTER
Left message for patient to update STEFAN if she is continuing to have difficulties getting Emgality filled. Asked her to call STEFAN with any needs.

## 2020-12-21 ENCOUNTER — IMMUNIZATION (OUTPATIENT)
Dept: LAB | Facility: HOSPITAL | Age: 50
End: 2020-12-21
Attending: PREVENTIVE MEDICINE
Payer: COMMERCIAL

## 2020-12-21 DIAGNOSIS — Z23 NEED FOR VACCINATION: ICD-10-CM

## 2020-12-21 PROCEDURE — 0001A PFIZER-BIONTECH COVID-19 VACCINE: CPT

## 2021-01-11 ENCOUNTER — IMMUNIZATION (OUTPATIENT)
Dept: LAB | Facility: HOSPITAL | Age: 51
End: 2021-01-11
Attending: PREVENTIVE MEDICINE

## 2021-01-11 DIAGNOSIS — Z23 NEED FOR VACCINATION: ICD-10-CM

## 2021-01-11 PROCEDURE — 0002A SARSCOV2 VAC 30MCG/0.3ML IM: CPT

## 2021-03-31 ENCOUNTER — LAB ENCOUNTER (OUTPATIENT)
Dept: LAB | Facility: HOSPITAL | Age: 51
End: 2021-03-31
Attending: INTERNAL MEDICINE
Payer: COMMERCIAL

## 2021-03-31 DIAGNOSIS — K76.0 FATTY LIVER: ICD-10-CM

## 2021-03-31 DIAGNOSIS — E78.2 MIXED HYPERLIPIDEMIA: ICD-10-CM

## 2021-03-31 DIAGNOSIS — E03.9 ACQUIRED HYPOTHYROIDISM: ICD-10-CM

## 2021-03-31 PROCEDURE — 84443 ASSAY THYROID STIM HORMONE: CPT

## 2021-03-31 PROCEDURE — 80061 LIPID PANEL: CPT

## 2021-03-31 PROCEDURE — 80053 COMPREHEN METABOLIC PANEL: CPT

## 2021-03-31 PROCEDURE — 36415 COLL VENOUS BLD VENIPUNCTURE: CPT

## 2021-03-31 PROCEDURE — 84439 ASSAY OF FREE THYROXINE: CPT

## 2021-04-06 ENCOUNTER — TELEPHONE (OUTPATIENT)
Dept: NEUROLOGY | Facility: CLINIC | Age: 51
End: 2021-04-06

## 2021-04-06 DIAGNOSIS — G43.709 CHRONIC MIGRAINE WITHOUT AURA WITHOUT STATUS MIGRAINOSUS, NOT INTRACTABLE: Primary | ICD-10-CM

## 2021-04-06 NOTE — TELEPHONE ENCOUNTER
PA for reauthorization of emgality received. Spoke with pt and she has 90% reduction of headaches and decreased need for rescue meds.      Started PA on [facility]   Key: [id number]

## 2021-04-14 ENCOUNTER — OFFICE VISIT (OUTPATIENT)
Dept: NEUROLOGY | Facility: CLINIC | Age: 51
End: 2021-04-14
Payer: COMMERCIAL

## 2021-04-14 VITALS
SYSTOLIC BLOOD PRESSURE: 128 MMHG | HEART RATE: 70 BPM | WEIGHT: 195 LBS | DIASTOLIC BLOOD PRESSURE: 72 MMHG | RESPIRATION RATE: 16 BRPM | BODY MASS INDEX: 31 KG/M2

## 2021-04-14 DIAGNOSIS — G43.709 CHRONIC MIGRAINE WITHOUT AURA WITHOUT STATUS MIGRAINOSUS, NOT INTRACTABLE: Primary | ICD-10-CM

## 2021-04-14 PROCEDURE — 99213 OFFICE O/P EST LOW 20 MIN: CPT | Performed by: OTHER

## 2021-04-14 PROCEDURE — 3078F DIAST BP <80 MM HG: CPT | Performed by: OTHER

## 2021-04-14 PROCEDURE — 3074F SYST BP LT 130 MM HG: CPT | Performed by: OTHER

## 2021-04-14 RX ORDER — GALCANEZUMAB 120 MG/ML
INJECTION, SOLUTION SUBCUTANEOUS
Qty: 1 PEN | Refills: 5 | Status: SHIPPED | OUTPATIENT
Start: 2021-04-14 | End: 2021-10-06

## 2021-04-14 NOTE — PROGRESS NOTES
HPI:    Patient ID: Jose Graham is a 49year old female. PCP: Dr Jones Postin    Follow up visit  Patient presents for follow up for migraine headache. She states migraines have reduced in frequency as well as in intensity and Emgality has been effective. Disorder Mother    • Other (hypothyroidism) Mother    • Heart Attack Father    • Other (kidney disease) Father    • Ovarian Cancer Maternal Cousin Female 48      Social History    Tobacco Use      Smoking status: Former Smoker        Packs/day: 1.00 EVERY DAY 90 tablet 3   • atorvastatin 40 MG Oral Tab Take 1 tablet (40 mg total) by mouth once daily. 90 tablet 3     Allergies:  Pcn [Penicillins]       RASH   PHYSICAL EXAM:   Physical Exam    Blood pressure 128/72, pulse 70, resp.  rate 16, weight 195 l every 30 days with maintenance dose of 120mg. Imaging & Referrals:  None       ID#[id number]    Migraine   Pertinent negatives include no dizziness or neck pain. Neurologic Problem  Pertinent negatives include no dizziness, headaches or neck pain.

## 2021-07-14 ENCOUNTER — HOSPITAL ENCOUNTER (OUTPATIENT)
Dept: MAMMOGRAPHY | Facility: HOSPITAL | Age: 51
Discharge: HOME | End: 2021-07-14
Attending: OBSTETRICS & GYNECOLOGY
Payer: COMMERCIAL

## 2021-07-14 DIAGNOSIS — Z12.31 ENCOUNTER FOR SCREENING MAMMOGRAM FOR BREAST CANCER: ICD-10-CM

## 2021-07-14 PROCEDURE — 77063 BREAST TOMOSYNTHESIS BI: CPT | Performed by: OBSTETRICS & GYNECOLOGY

## 2021-07-14 PROCEDURE — 77067 SCR MAMMO BI INCL CAD: CPT | Performed by: OBSTETRICS & GYNECOLOGY

## 2021-08-09 ENCOUNTER — ORDER TRANSCRIPTION (OUTPATIENT)
Dept: ADMINISTRATIVE | Facility: HOSPITAL | Age: 51
End: 2021-08-09

## 2021-08-09 DIAGNOSIS — E78.2 MIXED HYPERLIPIDEMIA: Primary | ICD-10-CM

## 2021-09-02 ENCOUNTER — HOSPITAL ENCOUNTER (OUTPATIENT)
Dept: CT IMAGING | Facility: HOSPITAL | Age: 51
Discharge: HOME | End: 2021-09-02
Attending: INTERNAL MEDICINE

## 2021-09-02 DIAGNOSIS — E78.2 MIXED HYPERLIPIDEMIA: ICD-10-CM

## 2021-10-06 ENCOUNTER — OFFICE VISIT (OUTPATIENT)
Dept: NEUROLOGY | Facility: CLINIC | Age: 51
End: 2021-10-06
Payer: COMMERCIAL

## 2021-10-06 VITALS
WEIGHT: 176 LBS | DIASTOLIC BLOOD PRESSURE: 70 MMHG | BODY MASS INDEX: 28 KG/M2 | RESPIRATION RATE: 16 BRPM | HEART RATE: 70 BPM | SYSTOLIC BLOOD PRESSURE: 126 MMHG

## 2021-10-06 DIAGNOSIS — G43.709 CHRONIC MIGRAINE WITHOUT AURA WITHOUT STATUS MIGRAINOSUS, NOT INTRACTABLE: Primary | ICD-10-CM

## 2021-10-06 PROCEDURE — 3078F DIAST BP <80 MM HG: CPT | Performed by: OTHER

## 2021-10-06 PROCEDURE — 99213 OFFICE O/P EST LOW 20 MIN: CPT | Performed by: OTHER

## 2021-10-06 PROCEDURE — 3074F SYST BP LT 130 MM HG: CPT | Performed by: OTHER

## 2021-10-06 RX ORDER — GALCANEZUMAB 120 MG/ML
INJECTION, SOLUTION SUBCUTANEOUS
Qty: 1 ML | Refills: 5 | Status: SHIPPED | OUTPATIENT
Start: 2021-10-06

## 2021-10-06 NOTE — PROGRESS NOTES
HPI:    Patient ID: Aristides Durbin is a 49year old female. PCP: Dr Ruthann Etienne    Follow up visit  Patient presents for follow up for migraine headache. She states migraines have improved significantly since we started [facility].  She gets headaches sporadic Lipids Mother    • Heart Attack Mother    • Stroke Mother    • Mental Disorder Mother    • Other (hypothyroidism) Mother    • Heart Attack Father    • Other (kidney disease) Father    • Ovarian Cancer Maternal Cousin Female 48   • No Known Problems Saimae total) by mouth daily. 90 tablet 3   • atorvastatin 40 MG Oral Tab Take 1 tablet (40 mg total) by mouth once daily. 90 tablet 3   • Aspirin-Acetaminophen-Caffeine (EXCEDRIN MIGRAINE OR) Take by mouth as needed.      • Insulin Pen Needle (PEN NEEDLES) 32G X This Visit:  Requested Prescriptions     Signed Prescriptions Disp Refills   • Galcanezumab-gnlm (EMGALITY) 120 MG/ML Subcutaneous Solution Auto-injector 1 mL 5     Sig: every 30 days with maintenance dose of 120mg.        Imaging & Referrals:  None       I

## 2021-10-06 NOTE — PROGRESS NOTES
HPI:    Patient ID: Jorge L Granados is a 49year old female. PCP: Dr Ariadna Dyer    Follow up visit  Patient presents for follow up for migraine headache. She states migraines have reduced in frequency as well as in intensity and Emgality has been effective. (hypothyroidism) Mother    • Heart Attack Father    • Other (kidney disease) Father    • Ovarian Cancer Maternal Cousin Female 48   • No Known Problems Daughter    • No Known Problems Son    • No Known Problems Sister    • No Known Problems Brother    • No Aspirin-Acetaminophen-Caffeine (EXCEDRIN MIGRAINE OR) Take by mouth as needed. • Insulin Pen Needle (PEN NEEDLES) 32G X 4 MM Does not apply Misc 1 each by Does not apply route every 7 days.  30 each 0   • Ipratropium Bromide 0.06 % Nasal Solution      • MG/ML Subcutaneous Solution Auto-injector 1 mL 5     Sig: every 30 days with maintenance dose of 120mg. Imaging & Referrals:  None       ID#[id number]    Migraine   Pertinent negatives include no dizziness or neck pain.    Neurologic Problem  Pertinent nega

## 2021-10-12 ENCOUNTER — HOSPITAL ENCOUNTER (OUTPATIENT)
Age: 51
Discharge: HOME | End: 2021-10-12
Payer: COMMERCIAL

## 2021-10-12 VITALS
RESPIRATION RATE: 18 BRPM | HEART RATE: 60 BPM | TEMPERATURE: 98 F | BODY MASS INDEX: 27.62 KG/M2 | HEIGHT: 67 IN | SYSTOLIC BLOOD PRESSURE: 138 MMHG | WEIGHT: 176 LBS | OXYGEN SATURATION: 99 % | DIASTOLIC BLOOD PRESSURE: 65 MMHG

## 2021-10-12 DIAGNOSIS — J01.91 ACUTE RECURRENT SINUSITIS, UNSPECIFIED LOCATION: Primary | ICD-10-CM

## 2021-10-12 PROCEDURE — 99203 OFFICE O/P NEW LOW 30 MIN: CPT | Performed by: NURSE PRACTITIONER

## 2021-10-12 RX ORDER — AZITHROMYCIN 250 MG/1
TABLET, FILM COATED ORAL
Qty: 6 TABLET | Refills: 0 | Status: SHIPPED | OUTPATIENT
Start: 2021-10-12 | End: 2021-10-17

## 2021-10-12 NOTE — ED PROVIDER NOTES
Patient Seen in: [facility] [place]      History   Patient presents with:  Sinus Problem    Stated Complaint: SINUS INFECTION    Subjective:   HPI    60-year-old female presents to the immediate care with complaints of sinus congestion and pain for t Social History    Tobacco Use      Smoking status: Former Smoker        Packs/day: 1.00        Years: 20.00        Pack years: 20        Types: Cigarettes        Quit date: 1/4/2006        Years since quitting: 15.7      Smokeless tobacco: Never Used place, and time.                ED Course   Labs Reviewed - No data to display    Medical records were reviewed patient does have penicillin allergies and states she is unsure of the medication that typically works previous treatments evaluated patient was

## 2021-10-22 NOTE — PROGRESS NOTES
Left a detailed message on pt's identified vm per HIPAA with physician's response.      Telephone Information:  Home Phone      [phone]

## 2021-10-26 DIAGNOSIS — G43.709 CHRONIC MIGRAINE WITHOUT AURA WITHOUT STATUS MIGRAINOSUS, NOT INTRACTABLE: ICD-10-CM

## 2021-10-27 RX ORDER — GALCANEZUMAB 120 MG/ML
INJECTION, SOLUTION SUBCUTANEOUS
Qty: 2 ML | Refills: 0 | OUTPATIENT
Start: 2021-10-27

## 2021-11-26 NOTE — H&P
[address] [facility] Patient Status:  Hospital Outpatient Surgery    3/7/1970 MRN [id number]   [facility] SURGERY Attending Joann Coulter MD   Hosp Day # 0 PCP Laura Coburn MD     History of Presen Heart Attack Father    • Other (kidney disease) Father    • Ovarian Cancer Maternal Cousin Female 48   • No Known Problems Daughter    • No Known Problems Son    • No Known Problems Sister    • No Known Problems Brother    • No Known Problems Daughter    • Obesity (BMI 30-39. 9)     Acute superficial gastritis without hemorrhage     Metabolic syndrome    CT shows worsening sinus disease especially on the left side. Chronic sinusitis    Endoscopic middle meatus antrostomy and ethmoidectomy.   Three days luis alberto

## 2021-12-10 ENCOUNTER — IMMUNIZATION (OUTPATIENT)
Dept: LAB | Facility: HOSPITAL | Age: 51
End: 2021-12-10
Attending: EMERGENCY MEDICINE
Payer: COMMERCIAL

## 2021-12-10 DIAGNOSIS — Z23 NEED FOR VACCINATION: Primary | ICD-10-CM

## 2021-12-10 PROCEDURE — 0004A SARSCOV2 VAC 30MCG/0.3ML IM: CPT

## 2021-12-10 RX ORDER — ACETAMINOPHEN 500 MG
1000 TABLET ORAL ONCE
Status: CANCELLED | OUTPATIENT
Start: 2021-12-10 | End: 2021-12-10

## 2021-12-13 ENCOUNTER — LAB ENCOUNTER (OUTPATIENT)
Dept: LAB | Facility: HOSPITAL | Age: 51
End: 2021-12-13
Attending: OTOLARYNGOLOGY
Payer: COMMERCIAL

## 2021-12-13 DIAGNOSIS — J32.8 OTHER CHRONIC SINUSITIS: ICD-10-CM

## 2021-12-13 DIAGNOSIS — J32.2 CHRONIC ETHMOIDAL SINUSITIS: ICD-10-CM

## 2021-12-13 DIAGNOSIS — J32.4 CHRONIC PANSINUSITIS: ICD-10-CM

## 2021-12-13 DIAGNOSIS — J32.0 CHRONIC MAXILLARY SINUSITIS: ICD-10-CM

## 2021-12-15 ENCOUNTER — ANESTHESIA EVENT (OUTPATIENT)
Dept: SURGERY | Facility: HOSPITAL | Age: 51
End: 2021-12-15
Payer: COMMERCIAL

## 2021-12-16 ENCOUNTER — ANESTHESIA (OUTPATIENT)
Dept: SURGERY | Facility: HOSPITAL | Age: 51
End: 2021-12-16
Payer: COMMERCIAL

## 2021-12-16 ENCOUNTER — HOSPITAL ENCOUNTER (OUTPATIENT)
Facility: HOSPITAL | Age: 51
Setting detail: HOSPITAL OUTPATIENT SURGERY
Discharge: HOME | End: 2021-12-16
Attending: OTOLARYNGOLOGY | Admitting: OTOLARYNGOLOGY
Payer: COMMERCIAL

## 2021-12-16 VITALS
TEMPERATURE: 98 F | HEIGHT: 67 IN | WEIGHT: 173.94 LBS | SYSTOLIC BLOOD PRESSURE: 128 MMHG | HEART RATE: 67 BPM | OXYGEN SATURATION: 100 % | BODY MASS INDEX: 27.3 KG/M2 | DIASTOLIC BLOOD PRESSURE: 75 MMHG | RESPIRATION RATE: 16 BRPM

## 2021-12-16 PROCEDURE — 09DU4ZZ EXTRACTION OF RIGHT ETHMOID SINUS, PERCUTANEOUS ENDOSCOPIC APPROACH: ICD-10-PCS | Performed by: OTOLARYNGOLOGY

## 2021-12-16 PROCEDURE — 8E09XBZ COMPUTER ASSISTED PROCEDURE OF HEAD AND NECK REGION: ICD-10-PCS | Performed by: OTOLARYNGOLOGY

## 2021-12-16 PROCEDURE — 099Q4ZZ DRAINAGE OF RIGHT MAXILLARY SINUS, PERCUTANEOUS ENDOSCOPIC APPROACH: ICD-10-PCS | Performed by: OTOLARYNGOLOGY

## 2021-12-16 PROCEDURE — 88304 TISSUE EXAM BY PATHOLOGIST: CPT | Performed by: OTOLARYNGOLOGY

## 2021-12-16 PROCEDURE — 09DV4ZZ EXTRACTION OF LEFT ETHMOID SINUS, PERCUTANEOUS ENDOSCOPIC APPROACH: ICD-10-PCS | Performed by: OTOLARYNGOLOGY

## 2021-12-16 PROCEDURE — 099R4ZZ DRAINAGE OF LEFT MAXILLARY SINUS, PERCUTANEOUS ENDOSCOPIC APPROACH: ICD-10-PCS | Performed by: OTOLARYNGOLOGY

## 2021-12-16 RX ORDER — METOCLOPRAMIDE HYDROCHLORIDE 5 MG/ML
INJECTION INTRAMUSCULAR; INTRAVENOUS AS NEEDED
Status: DISCONTINUED | OUTPATIENT
Start: 2021-12-16 | End: 2021-12-16 | Stop reason: SURG

## 2021-12-16 RX ORDER — GLYCOPYRROLATE 0.2 MG/ML
INJECTION, SOLUTION INTRAMUSCULAR; INTRAVENOUS AS NEEDED
Status: DISCONTINUED | OUTPATIENT
Start: 2021-12-16 | End: 2021-12-16 | Stop reason: SURG

## 2021-12-16 RX ORDER — SODIUM CHLORIDE, SODIUM LACTATE, POTASSIUM CHLORIDE, CALCIUM CHLORIDE 600; 310; 30; 20 MG/100ML; MG/100ML; MG/100ML; MG/100ML
INJECTION, SOLUTION INTRAVENOUS CONTINUOUS
Status: DISCONTINUED | OUTPATIENT
Start: 2021-12-16 | End: 2021-12-16

## 2021-12-16 RX ORDER — ONDANSETRON 2 MG/ML
4 INJECTION INTRAMUSCULAR; INTRAVENOUS AS NEEDED
Status: DISCONTINUED | OUTPATIENT
Start: 2021-12-16 | End: 2021-12-16

## 2021-12-16 RX ORDER — LIDOCAINE HYDROCHLORIDE AND EPINEPHRINE 10; 10 MG/ML; UG/ML
INJECTION, SOLUTION INFILTRATION; PERINEURAL AS NEEDED
Status: DISCONTINUED | OUTPATIENT
Start: 2021-12-16 | End: 2021-12-16 | Stop reason: HOSPADM

## 2021-12-16 RX ORDER — ONDANSETRON 2 MG/ML
INJECTION INTRAMUSCULAR; INTRAVENOUS AS NEEDED
Status: DISCONTINUED | OUTPATIENT
Start: 2021-12-16 | End: 2021-12-16 | Stop reason: SURG

## 2021-12-16 RX ORDER — NALOXONE HYDROCHLORIDE 0.4 MG/ML
80 INJECTION, SOLUTION INTRAMUSCULAR; INTRAVENOUS; SUBCUTANEOUS AS NEEDED
Status: DISCONTINUED | OUTPATIENT
Start: 2021-12-16 | End: 2021-12-16

## 2021-12-16 RX ORDER — ROCURONIUM BROMIDE 10 MG/ML
INJECTION, SOLUTION INTRAVENOUS AS NEEDED
Status: DISCONTINUED | OUTPATIENT
Start: 2021-12-16 | End: 2021-12-16 | Stop reason: SURG

## 2021-12-16 RX ORDER — CLINDAMYCIN PHOSPHATE 900 MG/50ML
900 INJECTION INTRAVENOUS ONCE
Status: COMPLETED | OUTPATIENT
Start: 2021-12-16 | End: 2021-12-16

## 2021-12-16 RX ORDER — PHENYLEPHRINE HCL 10 MG/ML
VIAL (ML) INJECTION AS NEEDED
Status: DISCONTINUED | OUTPATIENT
Start: 2021-12-16 | End: 2021-12-16 | Stop reason: SURG

## 2021-12-16 RX ORDER — ACETAMINOPHEN 500 MG
500 TABLET ORAL EVERY 6 HOURS PRN
COMMUNITY
End: 2021-12-16

## 2021-12-16 RX ORDER — HYDROMORPHONE HYDROCHLORIDE 1 MG/ML
0.4 INJECTION, SOLUTION INTRAMUSCULAR; INTRAVENOUS; SUBCUTANEOUS EVERY 5 MIN PRN
Status: DISCONTINUED | OUTPATIENT
Start: 2021-12-16 | End: 2021-12-16

## 2021-12-16 RX ORDER — HYDROCODONE BITARTRATE AND ACETAMINOPHEN 5; 325 MG/1; MG/1
1-2 TABLET ORAL EVERY 6 HOURS PRN
Qty: 20 TABLET | Refills: 0 | Status: SHIPPED | OUTPATIENT
Start: 2021-12-16

## 2021-12-16 RX ORDER — SCOLOPAMINE TRANSDERMAL SYSTEM 1 MG/1
1 PATCH, EXTENDED RELEASE TRANSDERMAL
Status: DISCONTINUED | OUTPATIENT
Start: 2021-12-16 | End: 2021-12-16

## 2021-12-16 RX ORDER — NEOSTIGMINE METHYLSULFATE 1 MG/ML
INJECTION INTRAVENOUS AS NEEDED
Status: DISCONTINUED | OUTPATIENT
Start: 2021-12-16 | End: 2021-12-16 | Stop reason: SURG

## 2021-12-16 RX ORDER — HYDROCODONE BITARTRATE AND ACETAMINOPHEN 5; 325 MG/1; MG/1
1 TABLET ORAL AS NEEDED
Status: DISCONTINUED | OUTPATIENT
Start: 2021-12-16 | End: 2021-12-16

## 2021-12-16 RX ORDER — HYDROCODONE BITARTRATE AND ACETAMINOPHEN 5; 325 MG/1; MG/1
2 TABLET ORAL AS NEEDED
Status: DISCONTINUED | OUTPATIENT
Start: 2021-12-16 | End: 2021-12-16

## 2021-12-16 RX ORDER — SCOLOPAMINE TRANSDERMAL SYSTEM 1 MG/1
PATCH, EXTENDED RELEASE TRANSDERMAL
Status: COMPLETED
Start: 2021-12-16 | End: 2021-12-16

## 2021-12-16 RX ORDER — DEXAMETHASONE SODIUM PHOSPHATE 4 MG/ML
VIAL (ML) INJECTION AS NEEDED
Status: DISCONTINUED | OUTPATIENT
Start: 2021-12-16 | End: 2021-12-16 | Stop reason: SURG

## 2021-12-16 RX ADMIN — ONDANSETRON 4 MG: 2 INJECTION INTRAMUSCULAR; INTRAVENOUS at 12:11:00

## 2021-12-16 RX ADMIN — NEOSTIGMINE METHYLSULFATE 4 MG: 1 INJECTION INTRAVENOUS at 12:43:00

## 2021-12-16 RX ADMIN — GLYCOPYRROLATE 0.8 MG: 0.2 INJECTION, SOLUTION INTRAMUSCULAR; INTRAVENOUS at 12:43:00

## 2021-12-16 RX ADMIN — PHENYLEPHRINE HCL 50 MCG: 10 MG/ML VIAL (ML) INJECTION at 12:09:00

## 2021-12-16 RX ADMIN — METOCLOPRAMIDE HYDROCHLORIDE 10 MG: 5 INJECTION INTRAMUSCULAR; INTRAVENOUS at 12:37:00

## 2021-12-16 RX ADMIN — PHENYLEPHRINE HCL 50 MCG: 10 MG/ML VIAL (ML) INJECTION at 12:07:00

## 2021-12-16 RX ADMIN — CLINDAMYCIN PHOSPHATE 900 MG: 900 INJECTION INTRAVENOUS at 11:54:00

## 2021-12-16 RX ADMIN — DEXAMETHASONE SODIUM PHOSPHATE 8 MG: 4 MG/ML VIAL (ML) INJECTION at 12:12:00

## 2021-12-16 RX ADMIN — ROCURONIUM BROMIDE 30 MG: 10 INJECTION, SOLUTION INTRAVENOUS at 11:47:00

## 2021-12-16 NOTE — ANESTHESIA PROCEDURE NOTES
Airway  Date/Time: 12/16/2021 11:50 AM  Urgency: elective    Airway not difficult    General Information and Staff    Patient location during procedure: OR  Anesthesiologist: Amol Blankenship DO  Performed: anesthesiologist     Indications and Patient

## 2021-12-16 NOTE — BRIEF OP NOTE
Pre-Operative Diagnosis: CHRONIC ETHMOIDAL SINUSITIS, CHRONIC MAXILLARY SINUSITIS, CHRONIC PANSINUSITIS     Post-Operative Diagnosis: CHRONIC ETHMOIDAL SINUSITIS, CHRONIC MAXILLARY SINUSITIS, CHRONIC PANSINUSITIS      Procedure Performed:   BILATERAL SINUS

## 2021-12-16 NOTE — ANESTHESIA PREPROCEDURE EVALUATION
PRE-OP EVALUATION    Patient Name: Olivia Horton    Admit Diagnosis: CHRONIC ETHMOIDAL SINUSITIS, CHRONIC MAXILLARY SINUSITIS, CHRONIC PANSINUSITIS    Pre-op Diagnosis: CHRONIC ETHMOIDAL SINUSITIS, CHRONIC MAXILLARY SINUSITIS, CHRONIC PANSINUSITIS    B Auto-injector, every 30 days with maintenance dose of 120mg., Disp: 1 mL, Rfl: 5, 12/1/2021  Semaglutide, 1 MG/DOSE, (OZEMPIC, 1 MG/DOSE,) 4 MG/3ML Subcutaneous Solution Pen-injector, Inject 1 mg into the skin once a week., Disp: 12 mL, Rfl: 0, 12/14/2021 use: No      Alcohol/week: 0.0 standard drinks      Drug use: No     Available pre-op labs reviewed.                Airway      Mallampati: II  Mouth opening: >3 FB  TM distance: > 6 cm  Neck ROM: full Cardiovascular    Cardiovascular exam normal.  Rhythm:

## 2021-12-16 NOTE — ANESTHESIA POSTPROCEDURE EVALUATION
[facility] Patient Status:  Hospital Outpatient Surgery   Age/Gender 49year old female MRN [id number]   Location [facility] Attending Jack Carolina MD   Hosp Day # 0 PCP Michael Hewitt MD

## 2021-12-16 NOTE — INTERVAL H&P NOTE
Pre-op Diagnosis: CHRONIC ETHMOIDAL SINUSITIS, CHRONIC MAXILLARY SINUSITIS, CHRONIC PANSINUSITIS    The above referenced H&P was reviewed by Janes Sainz MD on 12/16/2021, the patient was examined and no significant changes have occurred in the patie

## 2021-12-17 NOTE — OPERATIVE REPORT
[facility]    PATIENT'S NAME: Kortney Majano   ATTENDING PHYSICIAN: Wes Valle M.D. OPERATING PHYSICIAN: Wes Valle M.D.    PATIENT ACCOUNT#:   [de-identified]    LOCATION:  PACU [facility] PACU 10 EDWP 10  MEDICAL RECORD #:   [id number]       DATE fovea. Attention was turned the right-hand side, and the same procedure was performed. Once again, a maxillary antrostomy and ethmoidectomy were performed. The curved suction was used to remove any contents from the right maxillary sinus.   At this poi

## 2022-01-12 ENCOUNTER — TELEPHONE (OUTPATIENT)
Dept: INTERNAL MEDICINE CLINIC | Facility: HOSPITAL | Age: 52
End: 2022-01-12

## 2022-01-12 NOTE — TELEPHONE ENCOUNTER
Employee called to report covid exposure. Daughter tested pos. Employee is vaccinated and boosted and has no current sx, so does not require testing at this time. Advised employee to monitor closely and call back if symptoms develop.    Employee voiced

## 2022-01-13 ENCOUNTER — TELEPHONE (OUTPATIENT)
Dept: INTERNAL MEDICINE CLINIC | Facility: HOSPITAL | Age: 52
End: 2022-01-13

## 2022-01-13 DIAGNOSIS — Z11.52 ENCOUNTER FOR SCREENING FOR COVID-19: Primary | ICD-10-CM

## 2022-01-13 NOTE — TELEPHONE ENCOUNTER
[x] [facility]  []KAREN   [] [facility]        SYMPTOMS (reported via dashboard):  [] asymptomatic  [x] symptomatic  [] GI symptoms only  Symptom onset date:  Sinus pressure/headache 1/11/22  Employee has positive COVID Exposure?   Yes [x]     No []      Date of exposur

## 2022-01-14 ENCOUNTER — NURSE ONLY (OUTPATIENT)
Dept: LAB | Facility: HOSPITAL | Age: 52
End: 2022-01-14
Attending: PREVENTIVE MEDICINE

## 2022-01-14 DIAGNOSIS — Z11.52 ENCOUNTER FOR SCREENING FOR COVID-19: ICD-10-CM

## 2022-01-14 LAB — SARS-COV-2 RNA RESP QL NAA+PROBE: NOT DETECTED

## 2022-01-15 NOTE — TELEPHONE ENCOUNTER
Results and RTW guidelines:    COVID RESULT:    [x] Viewed by employee in [facility]. RTW plan and instructions as indicated on triage call. Manager notified. Estimated RTW date: 1/15/22  [] Discussed with employee   [] Unable to reach by phone.   Sent vi PROVIDED:  [x]  Plan as noted above  [x]  Length of time to obtain results   [x]  Quarantine instructions  [x]  Masking protocol   [x]  S/S of worsening infection/condition and importance of prompt medical re-evaluation including when to seek emergency car

## 2022-01-16 ENCOUNTER — HOSPITAL ENCOUNTER (OUTPATIENT)
Age: 52
Discharge: HOME | End: 2022-01-16
Payer: COMMERCIAL

## 2022-01-16 ENCOUNTER — NURSE ONLY (OUTPATIENT)
Dept: LAB | Facility: HOSPITAL | Age: 52
End: 2022-01-16
Attending: PREVENTIVE MEDICINE
Payer: COMMERCIAL

## 2022-01-16 VITALS
HEART RATE: 98 BPM | TEMPERATURE: 98 F | BODY MASS INDEX: 26.68 KG/M2 | RESPIRATION RATE: 16 BRPM | DIASTOLIC BLOOD PRESSURE: 68 MMHG | WEIGHT: 170 LBS | HEIGHT: 67 IN | OXYGEN SATURATION: 100 % | SYSTOLIC BLOOD PRESSURE: 103 MMHG

## 2022-01-16 DIAGNOSIS — H10.33 ACUTE CONJUNCTIVITIS OF BOTH EYES, UNSPECIFIED ACUTE CONJUNCTIVITIS TYPE: ICD-10-CM

## 2022-01-16 DIAGNOSIS — R09.81 NASAL SINUS CONGESTION: Primary | ICD-10-CM

## 2022-01-16 DIAGNOSIS — Z11.52 ENCOUNTER FOR SCREENING FOR COVID-19: ICD-10-CM

## 2022-01-16 PROCEDURE — 99213 OFFICE O/P EST LOW 20 MIN: CPT | Performed by: PHYSICIAN ASSISTANT

## 2022-01-16 RX ORDER — CETIRIZINE HYDROCHLORIDE 10 MG/1
10 TABLET ORAL DAILY
Qty: 30 TABLET | Refills: 0 | Status: SHIPPED | OUTPATIENT
Start: 2022-01-16 | End: 2022-02-15

## 2022-01-16 RX ORDER — POLYMYXIN B SULFATE AND TRIMETHOPRIM 1; 10000 MG/ML; [USP'U]/ML
1 SOLUTION OPHTHALMIC
Qty: 10 ML | Refills: 0 | Status: SHIPPED | OUTPATIENT
Start: 2022-01-16 | End: 2022-01-21

## 2022-01-16 NOTE — ED INITIAL ASSESSMENT (HPI)
Sinus pressure since Friday - rapid covid neg on Friday; everyone in house has covid; pt had pcr done just PTA - awaiting results    Pt woke up with B eye drainage

## 2022-01-16 NOTE — ED PROVIDER NOTES
Patient Seen in: [facility]      History   Patient presents with:   Eye Visual Problem  Sinus Problem    Stated Complaint: Sinus Concern; Pink Eye    Subjective:   HPI    28-year-old female here with complaint of bilateral crusty eye Surgical History:   Procedure Laterality Date   • ABLATION      endometrial ablation - Dr. Marcia Velazquez   • [address]    First pregnancy, then 2    • COLONOSCOPY     • COLONOSCOPY,DIAGNOSTIC  2/3/2011    Performed by Santino Mckenna at [facility] movements intact. Conjunctiva/sclera: Conjunctivae normal.      Pupils: Pupils are equal, round, and reactive to light. Cardiovascular:      Rate and Rhythm: Normal rate and regular rhythm. Heart sounds: Normal heart sounds.    Pulmonary:      E unspecified acute conjunctivitis type     Disposition:  Discharge  1/16/2022  9:27 am    Follow-up:  Kiran Salinas MD  [address] [facility], [address], [address] [place] [address] [address] [facility]

## 2022-01-17 ENCOUNTER — TELEPHONE (OUTPATIENT)
Dept: INTERNAL MEDICINE CLINIC | Facility: HOSPITAL | Age: 52
End: 2022-01-17

## 2022-01-17 NOTE — TELEPHONE ENCOUNTER
Outside Covid Testing done    Results and RTW guidelines:    COVID RESULT reported:      Test type:    [] Rapid outside         [x] PCR outside       [] Home Test    Date of test: 1/16/22    Test location: [address] [place] [address]         [] Resul results, off work minimum of 5 days from positive test or onset of symptoms (day 0)        On day 5, if asymptomatic or mildly symptomatic (with improving symptoms) may return to work day 6          On day 5, if symptomatic, call Employee Health for RTW sc asymptomatic    • Fever > 100F               Yes []          • Cough                          Yes [x]      • Shortness of breath  Yes []      • Congestion                 Yes [x]      • Runny nose                Yes [x]        • Loss of Smell

## 2022-01-19 LAB — SARS-COV-2 RNA RESP QL NAA+PROBE: DETECTED

## 2022-01-19 NOTE — TELEPHONE ENCOUNTER
Results and RTW guidelines:    COVID RESULT:    [x] Viewed by employee in [address] RTW plan and instructions as indicated on triage call. Manager notified. Estimated RTW date: 1/21/22  [x] Discussed with employee   [] Unable to reach by phone.   Sent v symptoms) may return to work day 6          On day 5, if symptomatic, call Employee Health for RTW screening        []  COVID positive result - call Employee Health on day 5 after symptom onset. The employee needs to be cleared by Employee Health to RTW.

## 2022-03-17 ENCOUNTER — LAB ENCOUNTER (OUTPATIENT)
Dept: LAB | Facility: HOSPITAL | Age: 52
End: 2022-03-17
Attending: INTERNAL MEDICINE
Payer: COMMERCIAL

## 2022-03-17 DIAGNOSIS — E88.81 METABOLIC SYNDROME: ICD-10-CM

## 2022-03-17 DIAGNOSIS — E03.9 HYPOTHYROIDISM, UNSPECIFIED TYPE: ICD-10-CM

## 2022-03-17 DIAGNOSIS — E55.9 VITAMIN D DEFICIENCY: ICD-10-CM

## 2022-03-17 LAB
ALBUMIN SERPL-MCNC: 3.8 G/DL (ref 3.4–5)
ALBUMIN/GLOB SERPL: 1.3 {RATIO} (ref 1–2)
ALP LIVER SERPL-CCNC: 75 U/L
ALT SERPL-CCNC: 36 U/L
ANION GAP SERPL CALC-SCNC: 3 MMOL/L (ref 0–18)
AST SERPL-CCNC: 21 U/L (ref 15–37)
BILIRUB SERPL-MCNC: 1.1 MG/DL (ref 0.1–2)
BUN BLD-MCNC: 16 MG/DL (ref 7–18)
BUN/CREAT SERPL: 17.6 (ref 10–20)
CALCIUM BLD-MCNC: 9.3 MG/DL (ref 8.5–10.1)
CHLORIDE SERPL-SCNC: 108 MMOL/L (ref 98–112)
CHOLEST SERPL-MCNC: 151 MG/DL (ref ?–200)
CO2 SERPL-SCNC: 31 MMOL/L (ref 21–32)
CREAT BLD-MCNC: 0.91 MG/DL
FASTING PATIENT LIPID ANSWER: YES
FASTING STATUS PATIENT QL REPORTED: YES
GLOBULIN PLAS-MCNC: 3 G/DL (ref 2.8–4.4)
GLUCOSE BLD-MCNC: 83 MG/DL (ref 70–99)
HDLC SERPL-MCNC: 49 MG/DL (ref 40–59)
LDLC SERPL CALC-MCNC: 72 MG/DL (ref ?–100)
NONHDLC SERPL-MCNC: 102 MG/DL (ref ?–130)
OSMOLALITY SERPL CALC.SUM OF ELEC: 294 MOSM/KG (ref 275–295)
POTASSIUM SERPL-SCNC: 4.5 MMOL/L (ref 3.5–5.1)
SODIUM SERPL-SCNC: 142 MMOL/L (ref 136–145)
T4 FREE SERPL-MCNC: 1.3 NG/DL (ref 0.8–1.7)
TRIGL SERPL-MCNC: 177 MG/DL (ref 30–149)
TSI SER-ACNC: 0.09 MIU/ML (ref 0.36–3.74)
VIT D+METAB SERPL-MCNC: 22.7 NG/ML (ref 30–100)
VLDLC SERPL CALC-MCNC: 27 MG/DL (ref 0–30)

## 2022-03-17 PROCEDURE — 84439 ASSAY OF FREE THYROXINE: CPT

## 2022-03-17 PROCEDURE — 80053 COMPREHEN METABOLIC PANEL: CPT

## 2022-03-17 PROCEDURE — 82306 VITAMIN D 25 HYDROXY: CPT

## 2022-03-17 PROCEDURE — 36415 COLL VENOUS BLD VENIPUNCTURE: CPT

## 2022-03-17 PROCEDURE — 80061 LIPID PANEL: CPT

## 2022-03-17 PROCEDURE — 84443 ASSAY THYROID STIM HORMONE: CPT

## 2022-03-29 ENCOUNTER — TELEPHONE (OUTPATIENT)
Dept: NEUROLOGY | Facility: CLINIC | Age: 52
End: 2022-03-29

## 2022-03-29 NOTE — TELEPHONE ENCOUNTER
Received fax from [id number] for [address] for Galcanezumab-[facility]) 120 MG/ML Subcutaneous Solution Auto-injector.

## 2022-04-06 ENCOUNTER — OFFICE VISIT (OUTPATIENT)
Dept: NEUROLOGY | Facility: CLINIC | Age: 52
End: 2022-04-06
Payer: COMMERCIAL

## 2022-04-06 VITALS
DIASTOLIC BLOOD PRESSURE: 76 MMHG | HEART RATE: 70 BPM | RESPIRATION RATE: 16 BRPM | SYSTOLIC BLOOD PRESSURE: 120 MMHG | BODY MASS INDEX: 27 KG/M2 | WEIGHT: 171.19 LBS

## 2022-04-06 DIAGNOSIS — G43.709 CHRONIC MIGRAINE WITHOUT AURA WITHOUT STATUS MIGRAINOSUS, NOT INTRACTABLE: Primary | ICD-10-CM

## 2022-04-06 PROCEDURE — 3074F SYST BP LT 130 MM HG: CPT | Performed by: OTHER

## 2022-04-06 PROCEDURE — 99213 OFFICE O/P EST LOW 20 MIN: CPT | Performed by: OTHER

## 2022-04-06 PROCEDURE — 3078F DIAST BP <80 MM HG: CPT | Performed by: OTHER

## 2022-04-06 RX ORDER — GALCANEZUMAB 120 MG/ML
INJECTION, SOLUTION SUBCUTANEOUS
Qty: 1 ML | Refills: 5 | Status: SHIPPED | OUTPATIENT
Start: 2022-04-06

## 2022-04-06 NOTE — PROGRESS NOTES
Patient is here for a follow-up for migraines. Patient states decrease in intensity and frequency of migraines since starting emgality.

## 2022-05-07 ENCOUNTER — HOSPITAL ENCOUNTER (OUTPATIENT)
Age: 52
Discharge: HOME | End: 2022-05-07
Attending: EMERGENCY MEDICINE
Payer: COMMERCIAL

## 2022-05-07 VITALS
HEART RATE: 76 BPM | HEIGHT: 67 IN | DIASTOLIC BLOOD PRESSURE: 80 MMHG | SYSTOLIC BLOOD PRESSURE: 119 MMHG | TEMPERATURE: 98 F | OXYGEN SATURATION: 99 % | BODY MASS INDEX: 26.68 KG/M2 | WEIGHT: 170 LBS | RESPIRATION RATE: 16 BRPM

## 2022-05-07 DIAGNOSIS — J01.10 ACUTE NON-RECURRENT FRONTAL SINUSITIS: Primary | ICD-10-CM

## 2022-05-07 LAB — SARS-COV-2 RNA RESP QL NAA+PROBE: NOT DETECTED

## 2022-05-07 RX ORDER — DOXYCYCLINE HYCLATE 100 MG/1
100 CAPSULE ORAL 2 TIMES DAILY
Qty: 14 CAPSULE | Refills: 0 | Status: SHIPPED | OUTPATIENT
Start: 2022-05-07 | End: 2022-05-14

## 2022-05-07 NOTE — ED INITIAL ASSESSMENT (HPI)
Pt presents to IC with concern for sinus infection. States she has had runny nose, cough, congestion for past 2 days. Called ENT yesterday but wasn't able to get response.

## 2022-05-17 ENCOUNTER — LAB ENCOUNTER (OUTPATIENT)
Dept: LAB | Age: 52
End: 2022-05-17
Attending: INTERNAL MEDICINE
Payer: COMMERCIAL

## 2022-05-17 DIAGNOSIS — E55.9 VITAMIN D DEFICIENCY: ICD-10-CM

## 2022-05-17 DIAGNOSIS — R94.6 ABNORMAL THYROID FUNCTION TEST: ICD-10-CM

## 2022-05-17 LAB
T4 FREE SERPL-MCNC: 1.1 NG/DL (ref 0.8–1.7)
TSI SER-ACNC: 0.34 MIU/ML (ref 0.36–3.74)
VIT D+METAB SERPL-MCNC: 61.9 NG/ML (ref 30–100)

## 2022-05-17 PROCEDURE — 84443 ASSAY THYROID STIM HORMONE: CPT

## 2022-05-17 PROCEDURE — 84439 ASSAY OF FREE THYROXINE: CPT

## 2022-05-17 PROCEDURE — 82306 VITAMIN D 25 HYDROXY: CPT

## 2022-05-17 PROCEDURE — 36415 COLL VENOUS BLD VENIPUNCTURE: CPT

## 2022-06-30 ENCOUNTER — TELEPHONE (OUTPATIENT)
Dept: NEUROLOGY | Facility: CLINIC | Age: 52
End: 2022-06-30

## 2022-06-30 NOTE — TELEPHONE ENCOUNTER
Pharmacy calling to inform us that pt has new prescription drug coverage and will require a PA for [facility] 120 MG/ML Subcutaneous Solution Auto-injector    New insurance:  [employer]  Ph: [phone]; [place] [id number]

## 2022-07-01 NOTE — TELEPHONE ENCOUNTER
PA for [facility] initiated via [employer]. Clinical questions sent to plan. Received automated approval effective from 6/1/22 to 7/1/23. LM on pharmacy confidential voice mail.

## 2022-07-06 DIAGNOSIS — G43.709 CHRONIC MIGRAINE WITHOUT AURA WITHOUT STATUS MIGRAINOSUS, NOT INTRACTABLE: ICD-10-CM

## 2022-07-26 RX ORDER — GALCANEZUMAB 120 MG/ML
INJECTION, SOLUTION SUBCUTANEOUS
Qty: 1 ML | Refills: 5 | OUTPATIENT
Start: 2022-07-26

## 2022-07-26 NOTE — TELEPHONE ENCOUNTER
See alt my chart message dated 7/7/22. Patient verified she is getting her mediation from [employer]. She states she has refills until December.

## 2022-10-26 DIAGNOSIS — G43.709 CHRONIC MIGRAINE WITHOUT AURA WITHOUT STATUS MIGRAINOSUS, NOT INTRACTABLE: ICD-10-CM

## 2022-11-01 RX ORDER — GALCANEZUMAB 120 MG/ML
INJECTION, SOLUTION SUBCUTANEOUS
Qty: 1 ML | Refills: 5 | Status: SHIPPED | OUTPATIENT
Start: 2022-11-01

## 2022-11-29 ENCOUNTER — TELEPHONE (OUTPATIENT)
Dept: NEUROLOGY | Facility: CLINIC | Age: 52
End: 2022-11-29

## 2022-11-29 NOTE — TELEPHONE ENCOUNTER
Received PA request for [facility] but approval on file through 7/1/23 and information faxed to [employer] with fax confirmation received.

## 2023-05-02 ENCOUNTER — OFFICE VISIT (OUTPATIENT)
Dept: NEUROLOGY | Facility: CLINIC | Age: 53
End: 2023-05-02
Payer: COMMERCIAL

## 2023-05-02 VITALS
BODY MASS INDEX: 28.25 KG/M2 | SYSTOLIC BLOOD PRESSURE: 108 MMHG | DIASTOLIC BLOOD PRESSURE: 64 MMHG | HEIGHT: 67 IN | WEIGHT: 180 LBS

## 2023-05-02 DIAGNOSIS — G43.709 CHRONIC MIGRAINE WITHOUT AURA WITHOUT STATUS MIGRAINOSUS, NOT INTRACTABLE: Primary | ICD-10-CM

## 2023-05-02 PROCEDURE — 3078F DIAST BP <80 MM HG: CPT | Performed by: OTHER

## 2023-05-02 PROCEDURE — 3074F SYST BP LT 130 MM HG: CPT | Performed by: OTHER

## 2023-05-02 PROCEDURE — 3008F BODY MASS INDEX DOCD: CPT | Performed by: OTHER

## 2023-05-02 PROCEDURE — 99213 OFFICE O/P EST LOW 20 MIN: CPT | Performed by: OTHER

## 2023-05-02 RX ORDER — METFORMIN HYDROCHLORIDE 500 MG/1
500 TABLET, EXTENDED RELEASE ORAL 2 TIMES DAILY
COMMUNITY
Start: 2023-03-03

## 2023-05-02 RX ORDER — GALCANEZUMAB 120 MG/ML
INJECTION, SOLUTION SUBCUTANEOUS
Qty: 3 EACH | Refills: 3 | Status: SHIPPED | OUTPATIENT
Start: 2023-05-02

## 2023-05-09 ENCOUNTER — PATIENT MESSAGE (OUTPATIENT)
Dept: NEUROLOGY | Facility: CLINIC | Age: 53
End: 2023-05-09

## 2023-05-09 NOTE — TELEPHONE ENCOUNTER
From: Brigette Bolaños  To: Nadeen Baltazar MD  Sent: 5/9/2023 12:46 PM CDT  Subject: Re fax script     can you please re fax my scrip to [facility] in [place] to fax [phone]. I called and they stated they did not receive it.

## 2023-10-30 ENCOUNTER — NURSE ONLY (OUTPATIENT)
Dept: INTERNAL MEDICINE CLINIC | Facility: HOSPITAL | Age: 53
End: 2023-10-30
Attending: EMERGENCY MEDICINE

## 2023-10-30 DIAGNOSIS — Z00.00 WELLNESS EXAMINATION: Primary | ICD-10-CM

## 2023-10-30 PROCEDURE — 86480 TB TEST CELL IMMUN MEASURE: CPT

## 2023-10-31 LAB
M TB IFN-G CD4+ T-CELLS BLD-ACNC: 0.02 IU/ML
M TB TUBERC IFN-G BLD QL: NEGATIVE
M TB TUBERC IGNF/MITOGEN IGNF CONTROL: >10 IU/ML
QFT TB1 AG MINUS NIL: 0.16 IU/ML
QFT TB2 AG MINUS NIL: 0.02 IU/ML

## 2023-12-13 ENCOUNTER — HOSPITAL ENCOUNTER (OUTPATIENT)
Age: 53
Discharge: HOME | End: 2023-12-13
Payer: COMMERCIAL

## 2023-12-13 VITALS
OXYGEN SATURATION: 99 % | HEART RATE: 98 BPM | SYSTOLIC BLOOD PRESSURE: 121 MMHG | RESPIRATION RATE: 16 BRPM | TEMPERATURE: 98 F | DIASTOLIC BLOOD PRESSURE: 73 MMHG

## 2023-12-13 DIAGNOSIS — J01.40 ACUTE NON-RECURRENT PANSINUSITIS: Primary | ICD-10-CM

## 2023-12-13 PROCEDURE — 99213 OFFICE O/P EST LOW 20 MIN: CPT | Performed by: NURSE PRACTITIONER

## 2023-12-13 RX ORDER — CLINDAMYCIN HYDROCHLORIDE 300 MG/1
300 CAPSULE ORAL 3 TIMES DAILY
Qty: 30 CAPSULE | Refills: 0 | Status: SHIPPED | OUTPATIENT
Start: 2023-12-13 | End: 2023-12-23

## 2024-01-09 ENCOUNTER — OFFICE VISIT (OUTPATIENT)
Dept: INTERNAL MEDICINE CLINIC | Facility: CLINIC | Age: 54
End: 2024-01-09
Payer: COMMERCIAL

## 2024-01-09 VITALS
HEIGHT: 67 IN | WEIGHT: 206 LBS | SYSTOLIC BLOOD PRESSURE: 142 MMHG | BODY MASS INDEX: 32.33 KG/M2 | HEART RATE: 76 BPM | DIASTOLIC BLOOD PRESSURE: 84 MMHG

## 2024-01-09 DIAGNOSIS — Z12.31 ENCOUNTER FOR SCREENING MAMMOGRAM FOR MALIGNANT NEOPLASM OF BREAST: ICD-10-CM

## 2024-01-09 DIAGNOSIS — Z12.11 ENCOUNTER FOR SCREENING FOR MALIGNANT NEOPLASM OF COLON: ICD-10-CM

## 2024-01-09 DIAGNOSIS — E78.2 MIXED HYPERLIPIDEMIA: ICD-10-CM

## 2024-01-09 DIAGNOSIS — Z13.0 SCREENING FOR DEFICIENCY ANEMIA: ICD-10-CM

## 2024-01-09 DIAGNOSIS — Z00.00 WELLNESS EXAMINATION: Primary | ICD-10-CM

## 2024-01-09 DIAGNOSIS — Z13.21 ENCOUNTER FOR VITAMIN DEFICIENCY SCREENING: ICD-10-CM

## 2024-01-09 DIAGNOSIS — E03.9 HYPOTHYROIDISM, UNSPECIFIED TYPE: ICD-10-CM

## 2024-01-09 PROCEDURE — 3077F SYST BP >= 140 MM HG: CPT | Performed by: NURSE PRACTITIONER

## 2024-01-09 PROCEDURE — 3008F BODY MASS INDEX DOCD: CPT | Performed by: NURSE PRACTITIONER

## 2024-01-09 PROCEDURE — 3079F DIAST BP 80-89 MM HG: CPT | Performed by: NURSE PRACTITIONER

## 2024-01-09 PROCEDURE — 99386 PREV VISIT NEW AGE 40-64: CPT | Performed by: NURSE PRACTITIONER

## 2024-01-09 RX ORDER — ATORVASTATIN CALCIUM 40 MG/1
40 TABLET, FILM COATED ORAL
Qty: 90 TABLET | Refills: 3 | Status: SHIPPED | OUTPATIENT
Start: 2024-01-09

## 2024-01-09 RX ORDER — LEVOTHYROXINE SODIUM 0.07 MG/1
75 TABLET ORAL
Qty: 90 TABLET | Refills: 1 | Status: SHIPPED | OUTPATIENT
Start: 2024-01-09

## 2024-01-09 NOTE — PROGRESS NOTES
Key Song is a 53 year old female.  HPI:   Pt presents to clinic for annual exam. Reports hx of HLD and hypothyroidism. She reports she has not had thyroid labs since mid 2022. She reports she was taking levo 75 mcg daily until about 4 weeks ago when she ran out but had 100 mcg at home so started to take those. She denies any fatigue, temperature intolerance, dizziness, CP, SOB, HA, palpitations, appetite or weight changes. She was seeing weight specialist but due to insurance changes was unable to follow up. She would like her blood work today. Denies any new complaints. Requests refills on medications.   Current Outpatient Medications   Medication Sig Dispense Refill    atorvastatin 40 MG Oral Tab Take 1 tablet (40 mg total) by mouth once daily. 90 tablet 3    levothyroxine 75 MCG Oral Tab Take 1 tablet (75 mcg total) by mouth before breakfast. 90 tablet 1    Galcanezumab-gnlm (EMGALITY) 120 MG/ML Subcutaneous Solution Auto-injector every 30 days with maintenance dose of 120mg. 3 each 3    Levothyroxine Sodium 100 MCG Oral Tab Take 1 tablet (100 mcg total) by mouth daily. 90 tablet 3    metFORMIN  MG Oral Tablet 24 Hr Take 1 tablet (500 mg total) by mouth in the morning and 1 tablet (500 mg total) before bedtime.        Past Medical History:   Diagnosis Date    Abdominal distention     Abdominal pain     Anxiety state     Bloating     Blood in the stool     Body piercing     Constipation     Disorder of thyroid     Diverticulosis 2011    Flatulence/gas pain/belching     Frequent use of laxatives     GERD (gastroesophageal reflux disease) 11/11    on meds    Heartburn     Hematometra     History of depression     Hypercholesteremia '12    on simvastatin in past, new med 2012    HYPOTHYROIDISM '90s    on meds    IBS 2011    colonoscopy    Irregular bowel habits     MIGRAINES '90s    on piter - Dr Mcwilliams    Migraines     Nausea     MARTIN (obstructive sleep apnea) 12/13/19 [facility] PSG    AHI 26.9 Supine AHI  45.2 non-supine AHI 19.9 [place] 86%    Pain with bowel movements     PONV (postoperative nausea and vomiting)     Sleep apnea     cpap    Thyroid disease     Visual impairment     glasses    Wears glasses     Weight gain       Social History:  Social History     Socioeconomic History    Marital status:    Tobacco Use    Smoking status: Former     Packs/day: 1.00     Years: 20.00     Additional pack years: 0.00     Total pack years: 20.00     Types: Cigarettes     Quit date: 2006     Years since quittin.0    Smokeless tobacco: Never    Tobacco comments:     15 pk yrs   Vaping Use    Vaping Use: Never used   Substance and Sexual Activity    Alcohol use: No     Alcohol/week: 0.0 standard drinks of alcohol    Drug use: No    Sexual activity: Yes     Partners: Male     Birth control/protection: Surgical     Comment: BTL    Other Topics Concern    Caffeine Concern Yes     Comment: 1-2 cups coffee per day    Exercise No        REVIEW OF SYSTEMS:   Review of Systems   Constitutional:  Negative for activity change, appetite change, chills, diaphoresis, fatigue, fever and unexpected weight change.   HENT:  Negative for congestion.    Eyes:  Negative for visual disturbance.   Respiratory:  Negative for cough, chest tightness, shortness of breath and wheezing.    Cardiovascular:  Negative for chest pain, palpitations and leg swelling.   Gastrointestinal:  Negative for abdominal pain, constipation, diarrhea, nausea and vomiting.   Endocrine: Negative.    Genitourinary:  Negative for difficulty urinating and vaginal bleeding.   Musculoskeletal:  Negative for arthralgias and back pain.   Skin: Negative.    Neurological:  Negative for dizziness, seizures, numbness and headaches.   Hematological: Negative.    Psychiatric/Behavioral: Negative.            EXAM:   /84 (BP Location: Right arm, Patient Position: Sitting, Cuff Size: large)   Pulse 76   Ht 5' 7\" (1.702 m)   Wt 206 lb (93.4 kg)   BMI 32.26  kg/m²     Physical Exam  Vitals reviewed.   Constitutional:       General: She is not in acute distress.     Appearance: Normal appearance. She is obese. She is not ill-appearing.   HENT:      Head: Normocephalic and atraumatic.      Right Ear: Tympanic membrane, ear canal and external ear normal.      Left Ear: Tympanic membrane, ear canal and external ear normal.      Nose: Nose normal.      Mouth/Throat:      Pharynx: Oropharynx is clear.   Eyes:      General: No scleral icterus.        Right eye: No discharge.         Left eye: No discharge.      Extraocular Movements: Extraocular movements intact.      Conjunctiva/sclera: Conjunctivae normal.      Pupils: Pupils are equal, round, and reactive to light.   Neck:      Thyroid: No thyroid mass or thyromegaly.   Cardiovascular:      Rate and Rhythm: Normal rate and regular rhythm.      Pulses: Normal pulses.      Heart sounds: Normal heart sounds.   Pulmonary:      Effort: Pulmonary effort is normal. No respiratory distress.      Breath sounds: Normal breath sounds.   Abdominal:      General: Abdomen is flat. Bowel sounds are normal. There is no distension.      Palpations: Abdomen is soft. There is no mass.      Tenderness: There is no abdominal tenderness.   Musculoskeletal:         General: Normal range of motion.      Cervical back: Normal range of motion and neck supple.      Right lower leg: No edema.      Left lower leg: No edema.   Lymphadenopathy:      Cervical: No cervical adenopathy.   Skin:     General: Skin is warm and dry.      Coloration: Skin is not jaundiced.   Neurological:      General: No focal deficit present.      Mental Status: She is alert and oriented to person, place, and time.      Motor: Motor function is intact.      Gait: Gait normal.   Psychiatric:         Mood and Affect: Mood normal.         Judgment: Judgment normal.            ASSESSMENT AND PLAN:   1. Wellness examination  Education provided on healthy lifestyle.  Diet: reduce  saturated fats, simple carbs and excess sugars. Hydrate. Leafy greens, legumes, nuts/seeds, healthy sources of Omega 3, lean proteins, complex carbs, berries.   Exercise 30 min daily cardio as tolerated.   Immunizations reviewed and recommendations provided  Preventative health screening recommendations reviewed.   Previous lab and imaging results reviewed.    - CBC With Differential With Platelet  - Vitamin D, 25-Hydroxy    2. Hypothyroidism, unspecified type  -refilled levo 75 mcg, will check labs, will f/u with patient once results posted to adjust tx if necessary.   - levothyroxine 75 MCG Oral Tab; Take 1 tablet (75 mcg total) by mouth before breakfast.  Dispense: 90 tablet; Refill: 1  - TSH W Reflex To Free T4 [E]; Future    3. Mixed hyperlipidemia  -refilled atorvastatin, will check FLP and CMP.   - atorvastatin 40 MG Oral Tab; Take 1 tablet (40 mg total) by mouth once daily.  Dispense: 90 tablet; Refill: 3  - Lipid Panel [E]; Future  - Comp Metabolic Panel (14) [E]; Future    4. Encounter for vitamin deficiency screening  - Vitamin D, 25-Hydroxy    5. Screening for deficiency anemia  - CBC With Differential With Platelet    6. Encounter for screening mammogram for malignant neoplasm of breast  - [facility] ERIK 2D+3D SCREENING BILAT (CPT=77067/23541); Future    7. Encounter for screening for malignant neoplasm of colon  - [facility] Telephone Colon Screen; Future       The patient indicates understanding of these issues and agrees to the plan.  The patient is asked to return in 3 months. .     The above note was creating using Dragon speech recognition technology. Please excuse any typos.

## 2024-01-10 ENCOUNTER — LAB ENCOUNTER (OUTPATIENT)
Dept: LAB | Facility: HOSPITAL | Age: 54
End: 2024-01-10
Attending: NURSE PRACTITIONER
Payer: COMMERCIAL

## 2024-01-10 DIAGNOSIS — E78.2 MIXED HYPERLIPIDEMIA: ICD-10-CM

## 2024-01-10 DIAGNOSIS — R79.89 ELEVATED SERUM CREATININE: Primary | ICD-10-CM

## 2024-01-10 DIAGNOSIS — E03.9 HYPOTHYROIDISM, UNSPECIFIED TYPE: ICD-10-CM

## 2024-01-10 LAB
ALBUMIN SERPL-MCNC: 4.7 G/DL (ref 3.2–4.8)
ALBUMIN/GLOB SERPL: 1.6 {RATIO} (ref 1–2)
ALP LIVER SERPL-CCNC: 88 U/L
ALT SERPL-CCNC: 25 U/L
ANION GAP SERPL CALC-SCNC: 5 MMOL/L (ref 0–18)
AST SERPL-CCNC: 24 U/L (ref ?–34)
BASOPHILS # BLD AUTO: 0.1 X10(3) UL (ref 0–0.2)
BASOPHILS NFR BLD AUTO: 1.7 %
BILIRUB SERPL-MCNC: 1.2 MG/DL (ref 0.3–1.2)
BUN BLD-MCNC: 14 MG/DL (ref 9–23)
BUN/CREAT SERPL: 12.4 (ref 10–20)
CALCIUM BLD-MCNC: 10.2 MG/DL (ref 8.7–10.4)
CHLORIDE SERPL-SCNC: 105 MMOL/L (ref 98–112)
CHOLEST SERPL-MCNC: 307 MG/DL (ref ?–200)
CO2 SERPL-SCNC: 29 MMOL/L (ref 21–32)
CREAT BLD-MCNC: 1.13 MG/DL
DEPRECATED RDW RBC AUTO: 43.6 FL (ref 35.1–46.3)
EGFRCR SERPLBLD CKD-EPI 2021: 59 ML/MIN/1.73M2 (ref 60–?)
EOSINOPHIL # BLD AUTO: 0.38 X10(3) UL (ref 0–0.7)
EOSINOPHIL NFR BLD AUTO: 6.6 %
ERYTHROCYTE [DISTWIDTH] IN BLOOD BY AUTOMATED COUNT: 13.2 % (ref 11–15)
FASTING PATIENT LIPID ANSWER: YES
FASTING STATUS PATIENT QL REPORTED: YES
GLOBULIN PLAS-MCNC: 3 G/DL (ref 2.8–4.4)
GLUCOSE BLD-MCNC: 96 MG/DL (ref 70–99)
HCT VFR BLD AUTO: 42.2 %
HDLC SERPL-MCNC: 46 MG/DL (ref 40–59)
HGB BLD-MCNC: 14.5 G/DL
IMM GRANULOCYTES # BLD AUTO: 0.01 X10(3) UL (ref 0–1)
IMM GRANULOCYTES NFR BLD: 0.2 %
LDLC SERPL CALC-MCNC: 209 MG/DL (ref ?–100)
LYMPHOCYTES # BLD AUTO: 1.7 X10(3) UL (ref 1–4)
LYMPHOCYTES NFR BLD AUTO: 29.3 %
MCH RBC QN AUTO: 31 PG (ref 26–34)
MCHC RBC AUTO-ENTMCNC: 34.4 G/DL (ref 31–37)
MCV RBC AUTO: 90.4 FL
MONOCYTES # BLD AUTO: 0.78 X10(3) UL (ref 0.1–1)
MONOCYTES NFR BLD AUTO: 13.4 %
NEUTROPHILS # BLD AUTO: 2.83 X10 (3) UL (ref 1.5–7.7)
NEUTROPHILS # BLD AUTO: 2.83 X10(3) UL (ref 1.5–7.7)
NEUTROPHILS NFR BLD AUTO: 48.8 %
NONHDLC SERPL-MCNC: 261 MG/DL (ref ?–130)
OSMOLALITY SERPL CALC.SUM OF ELEC: 288 MOSM/KG (ref 275–295)
PLATELET # BLD AUTO: 315 10(3)UL (ref 150–450)
POTASSIUM SERPL-SCNC: 4.2 MMOL/L (ref 3.5–5.1)
PROT SERPL-MCNC: 7.7 G/DL (ref 5.7–8.2)
RBC # BLD AUTO: 4.67 X10(6)UL
SODIUM SERPL-SCNC: 139 MMOL/L (ref 136–145)
TRIGL SERPL-MCNC: 261 MG/DL (ref 30–149)
TSI SER-ACNC: 1.89 MIU/ML (ref 0.55–4.78)
VIT D+METAB SERPL-MCNC: 34.2 NG/ML (ref 30–100)
VLDLC SERPL CALC-MCNC: 58 MG/DL (ref 0–30)
WBC # BLD AUTO: 5.8 X10(3) UL (ref 4–11)

## 2024-01-10 PROCEDURE — 36415 COLL VENOUS BLD VENIPUNCTURE: CPT | Performed by: NURSE PRACTITIONER

## 2024-01-10 PROCEDURE — 82306 VITAMIN D 25 HYDROXY: CPT | Performed by: NURSE PRACTITIONER

## 2024-01-10 PROCEDURE — 80053 COMPREHEN METABOLIC PANEL: CPT

## 2024-01-10 PROCEDURE — 84443 ASSAY THYROID STIM HORMONE: CPT

## 2024-01-10 PROCEDURE — 80061 LIPID PANEL: CPT

## 2024-01-10 PROCEDURE — 85025 COMPLETE CBC W/AUTO DIFF WBC: CPT | Performed by: NURSE PRACTITIONER

## 2024-01-18 ENCOUNTER — PATIENT MESSAGE (OUTPATIENT)
Dept: NEUROLOGY | Facility: CLINIC | Age: 54
End: 2024-01-18

## 2024-01-18 DIAGNOSIS — G43.709 CHRONIC MIGRAINE WITHOUT AURA WITHOUT STATUS MIGRAINOSUS, NOT INTRACTABLE: ICD-10-CM

## 2024-01-18 RX ORDER — GALCANEZUMAB 120 MG/ML
INJECTION, SOLUTION SUBCUTANEOUS
Qty: 3 EACH | Refills: 1 | Status: SHIPPED | OUTPATIENT
Start: 2024-01-18

## 2024-01-18 NOTE — TELEPHONE ENCOUNTER
From: Key Song  To: Tamie Gerber  Sent: 1/18/2024 11:09 AM CST  Subject: New script     Good morning-   I need to have my script for my emgality sent to the [facility]- with the insurance change i need to have it filled there-   thanks Maylin

## 2024-01-22 ENCOUNTER — TELEPHONE (OUTPATIENT)
Dept: NEUROLOGY | Facility: CLINIC | Age: 54
End: 2024-01-22

## 2024-01-22 NOTE — TELEPHONE ENCOUNTER
PA request  for Emgality received from [employer].  Form faxed to [facility] nurse with fax confirmation received.

## 2024-03-01 ENCOUNTER — HOSPITAL ENCOUNTER (OUTPATIENT)
Age: 54
Discharge: HOME | End: 2024-03-01
Payer: COMMERCIAL

## 2024-03-01 VITALS
RESPIRATION RATE: 22 BRPM | SYSTOLIC BLOOD PRESSURE: 168 MMHG | OXYGEN SATURATION: 99 % | DIASTOLIC BLOOD PRESSURE: 88 MMHG | HEART RATE: 77 BPM | TEMPERATURE: 98 F

## 2024-03-01 DIAGNOSIS — J11.1 INFLUENZA: Primary | ICD-10-CM

## 2024-03-01 LAB
POCT INFLUENZA A: NEGATIVE
POCT INFLUENZA B: POSITIVE
SARS-COV-2 RNA RESP QL NAA+PROBE: NOT DETECTED

## 2024-03-01 PROCEDURE — U0002 COVID-19 LAB TEST NON-CDC: HCPCS | Performed by: NURSE PRACTITIONER

## 2024-03-01 PROCEDURE — 87502 INFLUENZA DNA AMP PROBE: CPT | Performed by: NURSE PRACTITIONER

## 2024-03-01 PROCEDURE — 99213 OFFICE O/P EST LOW 20 MIN: CPT | Performed by: NURSE PRACTITIONER

## 2024-03-01 NOTE — DISCHARGE INSTRUCTIONS
Please increase fluids and make sure you are staying hydrated.  You may take Tylenol or ibuprofen as needed.  Close follow-up with your primary care provider as recommended.  Any worsening symptoms please go to the ER.

## 2024-03-01 NOTE — ED PROVIDER NOTES
Patient Seen in: [facility]      History     Chief Complaint   Patient presents with    Sinusitis     Stated Complaint: Sinus Problem    Subjective:   HPI    This is a well-appearing 53-year-old female with a history of hypothyroidism, hyperlipidemia who presents with a chief complaint of sinus pressure, frontal headache, nasal drainage over the last 3 days.  Her grandson recently tested positive for influenza B.  No shortness of breath or chest pain.  No dizziness.    Objective:   Past Medical History:   Diagnosis Date    Abdominal distention     Abdominal pain     Anxiety state     Bloating     Blood in the stool     Body piercing     Constipation     Disorder of thyroid     Diverticulosis     Flatulence/gas pain/belching     Frequent use of laxatives     GERD (gastroesophageal reflux disease)     on meds    Heartburn     Hematometra     History of depression     Hypercholesteremia     on simvastatin in past, new med 2012    HYPOTHYROIDISM '    on meds    IBS 2011    colonoscopy    Irregular bowel habits     MIGRAINES '    on inderal - Dr Mcwilliams    Migraines     Nausea     MARTIN (obstructive sleep apnea) 19 [facility] PSG    AHI 26.9 Supine AHI 45.2 non-supine AHI 19.9 [place] 86%    Pain with bowel movements     PONV (postoperative nausea and vomiting)     Sleep apnea     cpap    Thyroid disease     Visual impairment     glasses    Wears glasses     Weight gain               Past Surgical History:   Procedure Laterality Date    ABLATION      endometrial ablation - Dr. Allen          First pregnancy, then 2     COLONOSCOPY      COLONOSCOPY,DIAGNOSTIC  2/3/2011    Performed by YENI ASTORGA at [facility] [place] [facility] [place]    MRI BRAIN      OTHER SURGICAL HISTORY Bilateral     knee surgery - scope - ?? findings    SINUS SURGERY    2021    TUBAL LIGATION      Post partum after last preg                Social History     Socioeconomic History     Marital status:    Tobacco Use    Smoking status: Former     Packs/day: 1.00     Years: 20.00     Additional pack years: 0.00     Total pack years: 20.00     Types: Cigarettes     Quit date: 2006     Years since quittin.1    Smokeless tobacco: Never    Tobacco comments:     15 pk yrs   Vaping Use    Vaping Use: Never used   Substance and Sexual Activity    Alcohol use: No     Alcohol/week: 0.0 standard drinks of alcohol    Drug use: No    Sexual activity: Yes     Partners: Male     Birth control/protection: Surgical     Comment: BTL    Other Topics Concern    Caffeine Concern Yes     Comment: 1-2 cups coffee per day    Exercise No              Review of Systems   Constitutional:  Positive for chills.   HENT:  Positive for rhinorrhea and sinus pressure.    Neurological:  Positive for headaches.   All other systems reviewed and are negative.      Positive for stated complaint: Sinus Problem  Other systems are as noted in HPI.  Constitutional and vital signs reviewed.      All other systems reviewed and negative except as noted above.    Physical Exam     ED Triage Vitals [24 1706]   BP (!) 168/88   Pulse 77   Resp 22   Temp 97.9 °F (36.6 °C)   Temp src Temporal   SpO2 99 %   O2 Device None (Room air)       Current:BP (!) 168/88   Pulse 77   Temp 97.9 °F (36.6 °C) (Temporal)   Resp 22   SpO2 99%         Physical Exam  Vitals and nursing note reviewed.   Constitutional:       General: She is awake. She is not in acute distress.     Appearance: Normal appearance. She is not ill-appearing, toxic-appearing or diaphoretic.   HENT:      Head: Normocephalic and atraumatic.      Right Ear: Tympanic membrane, ear canal and external ear normal.      Left Ear: Tympanic membrane, ear canal and external ear normal.      Nose: Rhinorrhea present. Rhinorrhea is clear.      Mouth/Throat:      Mouth: Mucous membranes are moist.      Pharynx: Oropharynx is clear. Uvula midline.   Eyes:      General: Lids  are normal.      Extraocular Movements: Extraocular movements intact.      Conjunctiva/sclera: Conjunctivae normal.      Pupils: Pupils are equal, round, and reactive to light.   Cardiovascular:      Rate and Rhythm: Normal rate and regular rhythm.      Pulses: Normal pulses.      Heart sounds: Normal heart sounds.   Pulmonary:      Effort: Pulmonary effort is normal.      Breath sounds: Normal breath sounds and air entry. No stridor, decreased air movement or transmitted upper airway sounds.   Skin:     General: Skin is warm and dry.      Capillary Refill: Capillary refill takes less than 2 seconds.   Neurological:      General: No focal deficit present.      Mental Status: She is alert and oriented to person, place, and time.   Psychiatric:         Mood and Affect: Mood normal.         Behavior: Behavior normal. Behavior is cooperative.         Thought Content: Thought content normal.         Judgment: Judgment normal.       ED Course     Labs Reviewed   POCT FLU TEST - Abnormal; Notable for the following components:       Result Value    POCT INFLUENZA B Positive (*)     All other components within normal limits    Narrative:     This assay is a rapid molecular in vitro test utilizing nucleic acid amplification of influenza A and B viral RNA.   RAPID SARS-COV-2 BY PCR - Normal     Influenza B positive.  COVID-negative.  MDM     Medical Decision Making  Patient is well-appearing on exam, nontoxic appearance, exam as noted above for differential diagnose include not limited to influenza, COVID-19, viral URI.  Influenza positive, COVID-negative.  I did discuss these findings with the patient.  Finding examination symptoms consistent with influenza and PE.  Patient appears to have mild symptoms.  I did discuss with the patient option for Tamiflu which she is declining at this time.  Patient not hypoxic and not tachycardic, in no distress.  Close follow-up with PCP is recommended.  Strict impressions given.  Patient  verbalized plan of care and states understanding.    Amount and/or Complexity of Data Reviewed  ECG/medicine tests: ordered and independent interpretation performed. Decision-making details documented in ED Course.    Risk  OTC drugs.  Prescription drug management.        Disposition and Plan     Clinical Impression:  1. Influenza         Disposition:  Discharge  3/1/2024  5:30 pm    Follow-up:  Jacque Ye MD  [address] [place] [address] [facility] [id number] [phone]                Medications Prescribed:  Discharge Medication List as of 3/1/2024  5:33 PM

## 2024-04-02 ENCOUNTER — OFFICE VISIT (OUTPATIENT)
Dept: NEUROLOGY | Facility: CLINIC | Age: 54
End: 2024-04-02
Payer: COMMERCIAL

## 2024-04-02 VITALS — DIASTOLIC BLOOD PRESSURE: 90 MMHG | SYSTOLIC BLOOD PRESSURE: 154 MMHG | OXYGEN SATURATION: 99 % | HEART RATE: 56 BPM

## 2024-04-02 DIAGNOSIS — G43.709 CHRONIC MIGRAINE WITHOUT AURA WITHOUT STATUS MIGRAINOSUS, NOT INTRACTABLE: ICD-10-CM

## 2024-04-02 PROCEDURE — 99213 OFFICE O/P EST LOW 20 MIN: CPT | Performed by: OTHER

## 2024-04-02 RX ORDER — GALCANEZUMAB 120 MG/ML
INJECTION, SOLUTION SUBCUTANEOUS
Qty: 1 EACH | Refills: 5 | Status: SHIPPED | OUTPATIENT
Start: 2024-04-02

## 2024-04-02 NOTE — PROGRESS NOTES
HPI:    Patient ID: Key Sogn is a 54 year old female.  PCP: Dr Ye    Follow up visit : migraines      Patient is a pleasant 54  year old who presents for follow up for migraines. Last seen about a year ago and doing well.   She sporadically gets headaches, nothing as compared to what she had in the past, manageable and sometime takes Excedrin migraine.  On Emgality monthly injection for headache prevention.       HISTORY:  Past Medical History:   Diagnosis Date    Abdominal distention     Abdominal pain     Anxiety state     Bloating     Blood in the stool     Body piercing     Constipation     Disorder of thyroid     Diverticulosis     Flatulence/gas pain/belching     Frequent use of laxatives     GERD (gastroesophageal reflux disease)     on meds    Heartburn     Hematometra     History of depression     Hypercholesteremia     on simvastatin in past, new med 2012    HYPOTHYROIDISM '    on meds    IBS 2011    colonoscopy    Irregular bowel habits     MIGRAINES '    on inderal - Dr Mcwilliams    Migraines     Nausea     MARTIN (obstructive sleep apnea) 19 ESC PSG    AHI 26.9 Supine AHI 45.2 non-supine AHI 19.9 [place] 86%    Pain with bowel movements     PONV (postoperative nausea and vomiting)     Sleep apnea     cpap    Thyroid disease     Visual impairment     glasses    Wears glasses     Weight gain       Past Surgical History:   Procedure Laterality Date    ABLATION      endometrial ablation - Dr. Allen          First pregnancy, then 2     COLONOSCOPY      COLONOSCOPY,DIAGNOSTIC  2/3/2011    Performed by YENI ASTORGA at [facility]    MRI BRAIN      OTHER SURGICAL HISTORY Bilateral     knee surgery - scope - ?? findings    SINUS SURGERY    2021    TUBAL LIGATION      Post partum after last preg      Family History   Problem Relation Age of Onset    Diabetes Mother     Hypertension Mother     Lipids Mother     Heart Attack  Mother     Stroke Mother     Mental Disorder Mother     Other (hypothyroidism) Mother     Heart Attack Father     Other (kidney disease) Father     Ovarian Cancer Maternal Cousin Female 53    No Known Problems Daughter     No Known Problems Son     No Known Problems Sister     No Known Problems Brother     No Known Problems Daughter     No Known Problems Sister     No Known Problems Brother     No Known Problems Brother       Social History     Socioeconomic History    Marital status:    Tobacco Use    Smoking status: Former     Packs/day: 1.00     Years: 20.00     Additional pack years: 0.00     Total pack years: 20.00     Types: Cigarettes     Quit date: 2006     Years since quittin.2    Smokeless tobacco: Never    Tobacco comments:     15 pk yrs   Vaping Use    Vaping Use: Never used   Substance and Sexual Activity    Alcohol use: No     Alcohol/week: 0.0 standard drinks of alcohol    Drug use: No    Sexual activity: Yes     Partners: Male     Birth control/protection: Surgical     Comment: BTL    Other Topics Concern    Caffeine Concern Yes     Comment: 1-2 cups coffee per day    Exercise No            Review of Systems   Constitutional: Negative.    Eyes: Negative.    Respiratory: Negative.     Cardiovascular: Negative.    Gastrointestinal: Negative.  Negative for constipation.   Endocrine: Negative.    Genitourinary: Negative.    Musculoskeletal: Negative.  Negative for neck pain.   Skin: Negative.    Allergic/Immunologic: Negative.    Neurological: Negative.  Negative for dizziness and headaches.   Hematological: Negative.    Psychiatric/Behavioral: Negative.     All other systems reviewed and are negative.           Current Outpatient Medications   Medication Sig Dispense Refill    Galcanezumab-gnlm (EMGALITY) 120 MG/ML Subcutaneous Solution Auto-injector every 30 days with maintenance dose of 120mg. 1 each 5    atorvastatin 40 MG Oral Tab Take 1 tablet (40 mg total) by mouth once daily.  90 tablet 3    levothyroxine 75 MCG Oral Tab Take 1 tablet (75 mcg total) by mouth before breakfast. 90 tablet 1     Allergies:  Allergies   Allergen Reactions    Pcn [Penicillins] RASH      PHYSICAL EXAM:   Physical Exam  Blood pressure 154/90, pulse 56, SpO2 99%, not currently breastfeeding.    General: sitting in the chair and in no apparent distress  HEENT: Normocephalic and atraumatic. No sinus tenderess  Cardiovascular: Normal rate, regular rhythm   Pulmonary/Chest: not tested  Psychiatric:  normal mood and affect.   Neurological   Awake, alert and oriented to time, place and person.   Speech is fluent with intact comprehension, repetition and naming.   Normal attention and memory. Higher cortical function intact.  Cranial nerves 2-12: grossly intact ( limited, patient has face mask on)  Sensory : not tested  Motor: moving all extremities equally  Gait: normal casual gait by observation           ASSESSMENT/PLAN:   (G43.709) Chronic migraine without aura without status migrainosus, not intractable  (primary encounter diagnosis)    Patient is a 54-year-old female who presented for follow-up for migraines.  She is stable overall  Continue current management    Continue Emgality for migraine prevention.  Refills given      Follow up in about 12 months   See orders and medications filed with this encounter. The patient indicates understanding of these issues and agrees with the plan.      Tamie Gerber MD  [facility]          No orders of the defined types were placed in this encounter.      Meds This Visit:  Requested Prescriptions     Signed Prescriptions Disp Refills    Galcanezumab-gnlm (EMGALITY) 120 MG/ML Subcutaneous Solution Auto-injector 1 each 5     Sig: every 30 days with maintenance dose of 120mg.       Imaging & Referrals:  None       ID#[id number]      Migraine   Pertinent negatives include no dizziness or neck pain.

## 2024-05-01 ENCOUNTER — TELEPHONE (OUTPATIENT)
Dept: OBGYN CLINIC | Facility: CLINIC | Age: 54
End: 2024-05-01

## 2024-06-03 ENCOUNTER — PATIENT MESSAGE (OUTPATIENT)
Dept: INTERNAL MEDICINE CLINIC | Facility: CLINIC | Age: 54
End: 2024-06-03

## 2024-06-04 NOTE — TELEPHONE ENCOUNTER
From: Key Song  To: Amie Cuenca  Sent: 6/3/2024 1:27 PM CDT  Subject: Labs and meds    I got a letter stating that I have labs that need to be done- I had the labs done in January - do I need to have them done again. Also, I like to try wegovy as it is covered by my insurance and a copay card - as my insurance will not cover the Ozempic.

## 2024-06-05 ENCOUNTER — LAB ENCOUNTER (OUTPATIENT)
Dept: LAB | Facility: HOSPITAL | Age: 54
End: 2024-06-05
Attending: NURSE PRACTITIONER
Payer: COMMERCIAL

## 2024-06-05 DIAGNOSIS — E03.9 HYPOTHYROIDISM, UNSPECIFIED TYPE: ICD-10-CM

## 2024-06-05 DIAGNOSIS — E78.2 MIXED HYPERLIPIDEMIA: ICD-10-CM

## 2024-06-05 DIAGNOSIS — R79.89 ELEVATED SERUM CREATININE: ICD-10-CM

## 2024-06-05 LAB
ALBUMIN SERPL-MCNC: 4.8 G/DL (ref 3.2–4.8)
ALBUMIN/GLOB SERPL: 1.7 {RATIO} (ref 1–2)
ALP LIVER SERPL-CCNC: 89 U/L
ALT SERPL-CCNC: 30 U/L
ANION GAP SERPL CALC-SCNC: 9 MMOL/L (ref 0–18)
AST SERPL-CCNC: 28 U/L (ref ?–34)
BILIRUB SERPL-MCNC: 1.3 MG/DL (ref 0.3–1.2)
BUN BLD-MCNC: 15 MG/DL (ref 9–23)
BUN/CREAT SERPL: 11.8 (ref 10–20)
CALCIUM BLD-MCNC: 10.4 MG/DL (ref 8.7–10.4)
CHLORIDE SERPL-SCNC: 107 MMOL/L (ref 98–112)
CHOLEST SERPL-MCNC: 235 MG/DL (ref ?–200)
CO2 SERPL-SCNC: 26 MMOL/L (ref 21–32)
CREAT BLD-MCNC: 1.27 MG/DL
EGFRCR SERPLBLD CKD-EPI 2021: 51 ML/MIN/1.73M2 (ref 60–?)
FASTING PATIENT LIPID ANSWER: YES
FASTING STATUS PATIENT QL REPORTED: YES
GLOBULIN PLAS-MCNC: 2.8 G/DL (ref 2–3.5)
GLUCOSE BLD-MCNC: 94 MG/DL (ref 70–99)
HDLC SERPL-MCNC: 45 MG/DL (ref 40–59)
LDLC SERPL CALC-MCNC: 146 MG/DL (ref ?–100)
NONHDLC SERPL-MCNC: 190 MG/DL (ref ?–130)
OSMOLALITY SERPL CALC.SUM OF ELEC: 295 MOSM/KG (ref 275–295)
POTASSIUM SERPL-SCNC: 4.2 MMOL/L (ref 3.5–5.1)
PROT SERPL-MCNC: 7.6 G/DL (ref 5.7–8.2)
SODIUM SERPL-SCNC: 142 MMOL/L (ref 136–145)
T4 FREE SERPL-MCNC: 0.9 NG/DL (ref 0.8–1.7)
TRIGL SERPL-MCNC: 244 MG/DL (ref 30–149)
TSI SER-ACNC: 15.14 MIU/ML (ref 0.55–4.78)
VLDLC SERPL CALC-MCNC: 46 MG/DL (ref 0–30)

## 2024-06-05 PROCEDURE — 84443 ASSAY THYROID STIM HORMONE: CPT

## 2024-06-05 PROCEDURE — 36415 COLL VENOUS BLD VENIPUNCTURE: CPT

## 2024-06-05 PROCEDURE — 80061 LIPID PANEL: CPT

## 2024-06-05 PROCEDURE — 84439 ASSAY OF FREE THYROXINE: CPT

## 2024-06-05 PROCEDURE — 80053 COMPREHEN METABOLIC PANEL: CPT

## 2024-06-06 DIAGNOSIS — E78.2 MIXED HYPERLIPIDEMIA: Primary | ICD-10-CM

## 2024-06-10 ENCOUNTER — OFFICE VISIT (OUTPATIENT)
Dept: INTERNAL MEDICINE CLINIC | Facility: CLINIC | Age: 54
End: 2024-06-10
Payer: COMMERCIAL

## 2024-06-10 VITALS
SYSTOLIC BLOOD PRESSURE: 146 MMHG | WEIGHT: 213 LBS | DIASTOLIC BLOOD PRESSURE: 83 MMHG | HEART RATE: 63 BPM | BODY MASS INDEX: 33.43 KG/M2 | HEIGHT: 67 IN

## 2024-06-10 DIAGNOSIS — R79.89 SERUM CREATININE RAISED: ICD-10-CM

## 2024-06-10 DIAGNOSIS — E03.9 HYPOTHYROIDISM, UNSPECIFIED TYPE: Primary | ICD-10-CM

## 2024-06-10 DIAGNOSIS — I70.90 ATHEROSCLEROSIS: ICD-10-CM

## 2024-06-10 PROCEDURE — 99214 OFFICE O/P EST MOD 30 MIN: CPT | Performed by: NURSE PRACTITIONER

## 2024-06-10 RX ORDER — LEVOTHYROXINE SODIUM 88 UG/1
88 TABLET ORAL
Qty: 90 TABLET | Refills: 3 | Status: SHIPPED | OUTPATIENT
Start: 2024-06-10

## 2024-06-10 NOTE — PROGRESS NOTES
Key Song is a 54 year old female.  HPI:   Pt following up to discuss results.   She has a been taking levo 75 mcg NPO as prescribed for years, denies any missed doses. Denies any new sx. TSH is now 15  Total chol 235, . ASCVD 10 year risk score is 3.2%. CT calcium score completed 2021 showing coronary atherosclerosis, score 25. Pt is taking atorvastatin 40 mg daily.   Pt denies any CP, SOB, HA, dizziness, vision changes, palpitations, edema, appetite or weight changes.   Current Outpatient Medications   Medication Sig Dispense Refill    levothyroxine 88 MCG Oral Tab Take 1 tablet (88 mcg total) by mouth before breakfast. 90 tablet 3    Galcanezumab-gnlm (EMGALITY) 120 MG/ML Subcutaneous Solution Auto-injector every 30 days with maintenance dose of 120mg. 1 each 5    atorvastatin 40 MG Oral Tab Take 1 tablet (40 mg total) by mouth once daily. 90 tablet 3      Past Medical History:    Abdominal distention    Abdominal pain    Anxiety state    Bloating    Blood in the stool    Body piercing    Constipation    Disorder of thyroid    Diverticulosis    Flatulence/gas pain/belching    Frequent use of laxatives    GERD (gastroesophageal reflux disease)    on meds    Heartburn    Hematometra    History of depression    Hypercholesteremia    on simvastatin in past, new med 2012    HYPOTHYROIDISM    on meds    IBS    colonoscopy    Irregular bowel habits    MIGRAINES    on inderal - Dr Mcwilliams    Migraines    Nausea    MARTIN (obstructive sleep apnea)    AHI 26.9 Supine AHI 45.2 non-supine AHI 19.9 [place] 86%    Pain with bowel movements    PONV (postoperative nausea and vomiting)    Sleep apnea    cpap    Thyroid disease    Visual impairment    glasses    Wears glasses    Weight gain      Social History:  Social History     Socioeconomic History    Marital status:    Tobacco Use    Smoking status: Former     Current packs/day: 0.00     Average packs/day: 1 pack/day for 20.0 years (20.0 ttl pk-yrs)      Types: Cigarettes     Start date: 1986     Quit date: 2006     Years since quittin.4    Smokeless tobacco: Never    Tobacco comments:     15 pk yrs   Vaping Use    Vaping status: Never Used   Substance and Sexual Activity    Alcohol use: No     Alcohol/week: 0.0 standard drinks of alcohol    Drug use: No    Sexual activity: Yes     Partners: Male     Birth control/protection: Surgical     Comment: BTL 2007   Other Topics Concern    Caffeine Concern Yes     Comment: 1-2 cups coffee per day    Exercise No        REVIEW OF SYSTEMS:   Review of Systems   All other systems reviewed and are negative.         EXAM:   /83 (BP Location: Right arm, Patient Position: Sitting, Cuff Size: large)   Pulse 63   Ht 5' 7\" (1.702 m)   Wt 213 lb (96.6 kg)   BMI 33.36 kg/m²     Physical Exam  Vitals reviewed.   Constitutional:       General: She is not in acute distress.  Eyes:      General: No scleral icterus.     Conjunctiva/sclera: Conjunctivae normal.      Pupils: Pupils are equal, round, and reactive to light.   Neck:      Vascular: No carotid bruit.   Cardiovascular:      Rate and Rhythm: Normal rate and regular rhythm.      Pulses: Normal pulses.      Heart sounds: Normal heart sounds.   Pulmonary:      Effort: Pulmonary effort is normal.      Breath sounds: Normal breath sounds.   Musculoskeletal:         General: Normal range of motion.      Right lower leg: No edema.      Left lower leg: No edema.   Skin:     General: Skin is warm.      Coloration: Skin is not jaundiced.   Neurological:      General: No focal deficit present.      Mental Status: She is alert and oriented to person, place, and time.   Psychiatric:         Mood and Affect: Mood normal.         Judgment: Judgment normal.            ASSESSMENT AND PLAN:   1. Hypothyroidism, unspecified type  TSH 15, will increase levo from 75 to 88 mcg and repeat fasting in 4-6 weeks. Reviewed dose and s/e.   - TSH W Reflex To Free T4 [E]; Future    2.  Atherosclerosis  -CT calcium score 25, 10 year ASCVD 3.2%  -Advised to check BP daily and keep log, f/u in 2 weeks.   -Echo ordered.   - CARD ECHO 2D DOPPLER (CPT=93306); Future    3. Serum creatinine raised  -has been increasing, will refer to nephrology, avoid ibuprofen/NSAIDs.   - Nephrology Referral - In Network       The patient indicates understanding of these issues and agrees to the plan.  The patient is asked to return in 2 weeks.     The above note was creating using Dragon speech recognition technology. Please excuse any typos.

## 2024-06-13 ENCOUNTER — OFFICE VISIT (OUTPATIENT)
Dept: INTERNAL MEDICINE CLINIC | Facility: CLINIC | Age: 54
End: 2024-06-13
Payer: COMMERCIAL

## 2024-06-13 VITALS
RESPIRATION RATE: 18 BRPM | WEIGHT: 212.13 LBS | DIASTOLIC BLOOD PRESSURE: 83 MMHG | SYSTOLIC BLOOD PRESSURE: 130 MMHG | HEART RATE: 69 BPM | HEIGHT: 67 IN | OXYGEN SATURATION: 98 % | BODY MASS INDEX: 33.29 KG/M2

## 2024-06-13 DIAGNOSIS — E66.09 CLASS 1 OBESITY DUE TO EXCESS CALORIES WITHOUT SERIOUS COMORBIDITY WITH BODY MASS INDEX (BMI) OF 33.0 TO 33.9 IN ADULT: Primary | ICD-10-CM

## 2024-06-13 PROCEDURE — 99215 OFFICE O/P EST HI 40 MIN: CPT | Performed by: INTERNAL MEDICINE

## 2024-06-13 NOTE — PROGRESS NOTES
Key Song is a 54 year old female.  Chief Complaint   Patient presents with    Weight Management     HPI:   Patient presented today for weight loss management.  She states that she used to see Dr. Arreola at [facility] for weight loss long time ago.  She was started on Ozempic for her metabolic syndrome and did very well with good weight loss.  But now she has been off of Ozempic for about 2 years.  She states that she feels like all her weight is coming back she is back to her heaviest weight.  She would like to be restarted on some medication to help with weight loss she states that she has been trying with improving her diet and increasing her exercise but it has been more and more difficult for her to maintain her weight    Previous weight loss medications  metformin   Ozempic    Previous weight loss programs  None    Physical activity  Walks 15 to 20 minutes a day.  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    Diet    Tries her best to stay low-carb because son has diabetes type 1 and they both try to manage their diet together.    24-hour food recall  B- no breaskfast  L- yougurt, salad, chicken   D- bagel with egg and cheese  L- salad with chicken and french fries      Had ablation 15-20 years ago   Menopause symptoms started 3-4 years ago   No family history of thyroid cancer      Current Outpatient Medications   Medication Sig Dispense Refill    semaglutide-weight management 0.25 MG/0.5ML Subcutaneous Solution Auto-injector Inject 0.5 mL (0.25 mg total) into the skin once a week for 4 doses. 2 mL 0    levothyroxine 88 MCG Oral Tab Take 1 tablet (88 mcg total) by mouth before breakfast. 90 tablet 3    Galcanezumab-gnlm (EMGALITY) 120 MG/ML Subcutaneous Solution Auto-injector every 30 days with maintenance dose of 120mg. 1 each 5    atorvastatin 40 MG Oral Tab Take 1 tablet (40 mg total) by mouth once daily. 90 tablet 3      Past Medical History:    Abdominal distention     Abdominal pain    Anxiety state    Bloating    Blood in the stool    Body piercing    Constipation    Disorder of thyroid    Diverticulosis    Flatulence/gas pain/belching    Frequent use of laxatives    GERD (gastroesophageal reflux disease)    on meds    Heartburn    Hematometra    History of depression    Hypercholesteremia    on simvastatin in past, new med 2012    HYPOTHYROIDISM    on meds    IBS    colonoscopy    Irregular bowel habits    MIGRAINES    on inderal - Dr Mcwilliams    Migraines    Nausea    MARTIN (obstructive sleep apnea)    AHI 26.9 Supine AHI 45.2 non-supine AHI 19.9 [place] 86%    Pain with bowel movements    PONV (postoperative nausea and vomiting)    Sleep apnea    cpap    Thyroid disease    Visual impairment    glasses    Wears glasses    Weight gain      Past Surgical History:   Procedure Laterality Date    Ablation      endometrial ablation - Dr. Allen          First pregnancy, then 2     Colonoscopy      Colonoscopy,diagnostic  2/3/2011    Performed by YENI ASTORGA at [facility] [place]    Mri brain      Other surgical history Bilateral     knee surgery - scope - ?? findings    Sinus surgery    2021    Tubal ligation      Post partum after last preg      Social History:  Social History     Socioeconomic History    Marital status:    Tobacco Use    Smoking status: Former     Current packs/day: 0.00     Average packs/day: 1 pack/day for 20.0 years (20.0 ttl pk-yrs)     Types: Cigarettes     Start date: 1986     Quit date: 2006     Years since quittin.4    Smokeless tobacco: Never    Tobacco comments:     15 pk yrs   Vaping Use    Vaping status: Never Used   Substance and Sexual Activity    Alcohol use: No     Alcohol/week: 0.0 standard drinks of alcohol    Drug use: No    Sexual activity: Yes     Partners: Male     Birth control/protection: Surgical     Comment: BTL    Other Topics Concern    Caffeine Concern Yes      Comment: 1-2 cups coffee per day    Exercise No      Family History   Problem Relation Age of Onset    Diabetes Mother     Hypertension Mother     Lipids Mother     Heart Attack Mother     Stroke Mother     Mental Disorder Mother     Other (hypothyroidism) Mother     Heart Attack Father     Other (kidney disease) Father     Ovarian Cancer Maternal Cousin Female 53    No Known Problems Daughter     No Known Problems Son     No Known Problems Sister     No Known Problems Brother     No Known Problems Daughter     No Known Problems Sister     No Known Problems Brother     No Known Problems Brother       Allergies   Allergen Reactions    Pcn [Penicillins] RASH        REVIEW OF SYSTEMS:   Review of Systems   Review of Systems   Constitutional: Negative for activity change, appetite change and fever.   HENT: Negative for congestion and voice change.    Respiratory: Negative for cough and shortness of breath.    Cardiovascular: Negative for chest pain.   Gastrointestinal: Negative for abdominal distention, abdominal pain and vomiting.   Genitourinary: Negative for hematuria.   Skin: Negative for wound.   Psychiatric/Behavioral: Negative for behavioral problems.   Wt Readings from Last 5 Encounters:   06/13/24 212 lb 1.6 oz (96.2 kg)   06/10/24 213 lb (96.6 kg)   01/09/24 206 lb (93.4 kg)   05/02/23 180 lb (81.6 kg)   05/07/22 170 lb (77.1 kg)     Body mass index is 33.22 kg/m².      EXAM:   /83 (BP Location: Left arm, Patient Position: Sitting, Cuff Size: large)   Pulse 69   Resp 18   Ht 5' 7\" (1.702 m)   Wt 212 lb 1.6 oz (96.2 kg)   SpO2 98%   BMI 33.22 kg/m²   Physical Exam   Constitutional:       Appearance: Normal appearance.   HENT:      Head: Normocephalic.   Eyes:      Conjunctiva/sclera: Conjunctivae normal.   Cardiovascular:      Rate and Rhythm: Normal rate and regular rhythm.      Heart sounds: Normal heart sounds. No murmur heard.  Pulmonary:      Effort: Pulmonary effort is normal.      Breath  sounds: Normal breath sounds. No rhonchi or rales.   Abdominal:      General: Bowel sounds are normal.      Palpations: Abdomen is soft.      Tenderness: There is no abdominal tenderness.   Musculoskeletal:      Cervical back: Neck supple.      Right lower leg: No edema.      Left lower leg: No edema.   Skin:     General: Skin is warm and dry.   Neurological:      General: No focal deficit present.      Mental Status: He is alert and oriented to person, place, and time. Mental status is at baseline.   Psychiatric:         Mood and Affect: Mood normal.         Behavior: Behavior normal.       ASSESSMENT AND PLAN:   1. Class 1 obesity due to excess calories without serious comorbidity with body mass index (BMI) of 33.0 to 33.9 in adult  -Detailed discussion about increasing protein in her diet.  Packet for weight loss education provided  -Detailed discussion about exercise.  And adding strength training exercise to her routine  -Add more fresh veggies and fruits  -Check hemoglobin A1c  -Her recent labs reviewed.  TSH was elevated but her Synthroid dose has already been adjusted per PCP  -Given her elevated BMI and patient already trying exercise and diet without any weight loss success she will benefit from Wegovy.  -Side effects of Wegovy discussed in detail with the patient.  - Hemoglobin A1C [E]; Future  - semaglutide-weight management 0.25 MG/0.5ML Subcutaneous Solution Auto-injector; Inject 0.5 mL (0.25 mg total) into the skin once a week for 4 doses.  Dispense: 2 mL; Refill: 0      Encounter Times  PreCharting:   minutes    Reviewing/Obtaining: 10  minutes      Medical Exam:   minutes    Plan: 10  minutes      Notes:   10minutes    Counseling/Education: 15  minutes      Referring/Communicating:   minutes    Ind Interpretation:   minutes      Care Coordination:   minutes       My total time spent caring for the patient on the day of the encounter:   minutes.        The patient indicates understanding of these  issues and agrees to the plan.  No follow-ups on file.    Tere Cardoso MD

## 2024-06-20 ENCOUNTER — LAB ENCOUNTER (OUTPATIENT)
Dept: LAB | Facility: HOSPITAL | Age: 54
End: 2024-06-20
Attending: INTERNAL MEDICINE

## 2024-06-20 DIAGNOSIS — E66.09 CLASS 1 OBESITY DUE TO EXCESS CALORIES WITHOUT SERIOUS COMORBIDITY WITH BODY MASS INDEX (BMI) OF 33.0 TO 33.9 IN ADULT: ICD-10-CM

## 2024-06-20 LAB
EST. AVERAGE GLUCOSE BLD GHB EST-MCNC: 123 MG/DL (ref 68–126)
HBA1C MFR BLD: 5.9 % (ref ?–5.7)

## 2024-06-20 PROCEDURE — 36415 COLL VENOUS BLD VENIPUNCTURE: CPT

## 2024-06-20 PROCEDURE — 83036 HEMOGLOBIN GLYCOSYLATED A1C: CPT

## 2024-06-21 ENCOUNTER — TELEPHONE (OUTPATIENT)
Dept: INTERNAL MEDICINE CLINIC | Facility: CLINIC | Age: 54
End: 2024-06-21

## 2024-06-21 NOTE — TELEPHONE ENCOUNTER
Form received from [facility] requesting prior authorization for WEGOVY 0.25 MG/0.5 ML Pen, inject 0.5 M: (o.25 MG total) into the skin once a week for 4 doses     TERE LUZ key: [id number]     Insurance Phone number:[phone]  Insurance Fax number:[phone]

## 2024-06-25 ENCOUNTER — PATIENT MESSAGE (OUTPATIENT)
Dept: INTERNAL MEDICINE CLINIC | Facility: CLINIC | Age: 54
End: 2024-06-25

## 2024-06-25 DIAGNOSIS — E66.09 CLASS 1 OBESITY DUE TO EXCESS CALORIES WITHOUT SERIOUS COMORBIDITY WITH BODY MASS INDEX (BMI) OF 33.0 TO 33.9 IN ADULT: ICD-10-CM

## 2024-06-26 NOTE — TELEPHONE ENCOUNTER
From: Key Song  To: Tere Cardoso  Sent: 6/25/2024 8:05 PM CDT  Subject: piror auth     I wanted to check to see if you received [facility] said they sent it.

## 2024-07-02 ENCOUNTER — OFFICE VISIT (OUTPATIENT)
Dept: NEPHROLOGY | Facility: CLINIC | Age: 54
End: 2024-07-02
Payer: COMMERCIAL

## 2024-07-02 VITALS
SYSTOLIC BLOOD PRESSURE: 120 MMHG | HEART RATE: 65 BPM | BODY MASS INDEX: 33 KG/M2 | DIASTOLIC BLOOD PRESSURE: 69 MMHG | WEIGHT: 213 LBS

## 2024-07-02 DIAGNOSIS — N18.31 STAGE 3A CHRONIC KIDNEY DISEASE (HCC): Primary | ICD-10-CM

## 2024-07-02 PROCEDURE — 99205 OFFICE O/P NEW HI 60 MIN: CPT | Performed by: INTERNAL MEDICINE

## 2024-07-02 NOTE — PATIENT INSTRUCTIONS
Please do labs and kidney ultrasound as ordered.  Try to avoid any further use of nonsteroidal anti-inflammatory agents.

## 2024-07-02 NOTE — PROGRESS NOTES
07/02/24        Patient: Key Song   YOB: 1970   Date of Visit: 7/2/2024       Dear  Dr. Cassi MD,      Thank you for referring Key Song to my practice.  Please find my assessment and plan below.      As you know she is a 54-year-old female with a history of hypothyroidism hypercholesterolemia and a long-term history of severe migraine headaches who I now the pleasure of seeing for evaluation of chronic kidney disease stage III.  Laboratory studies have been reviewed in care everywhere and in epic.  Creatinine was borderline elevated at 1.13 back in November 2019.  Was 1.12 in March 2021 but was better at 0.91 in March 2022.  [place] in January 2024 creatinine was 1.13 and on June 5, 2024 creatinine was up to 1.27 with an estimated GFR of 51 cc/min and renal consultation has now been advised.    Her past medical history is significant for hypothyroidism, hypercholesterolemia and obesity.  She has had a long-term history of difficult to control migraine headaches dating back to her 20s.  She often would get daily headaches and would use nonsteroidal anti-inflammatory agents on a daily basis.  Approximately 2 years ago she was started on Emgality which has markedly improved her migraine headaches.  Still uses Excedrin Migraine but only once or twice a month.    Social history quit smoking cigarettes in 2006.  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  Family history is notable for coronary artery disease and diabetes.  She has an aunt who was on dialysis.  Her father and an uncle have chronic kidney disease but not on dialysis.  Further details not clear.  Review of system patient otherwise states she is doing well without any chest pain, shortness of breath, GI or urinary tract symptoms.  10 point review of systems is otherwise unremarkable.    Physical exam her blood pressure 120/69 with a pulse 65 she weighed 213 pounds.  Her neck was supple without JVD.  Lungs were clear.   Heart revealed a regular rate and rhythm without gallops, murmurs or rubs.  Abdomen was soft, flat, nontender without organomegaly, masses or bruits.  Extremities revealed no clubbing, cyanosis or edema.    I therefore informed the patient that she does appear to have chronic kidney disease stage III.  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  Other causes need to be excluded.  For completion sake a repeat CBC, renal panel, urinalysis, urine for microalbumin, urine for Bence-Flanagan protein, sed rate, connective tissue profile and a renal ultrasound have been ordered.  Further impressions and recommendations will be forthcoming after reviewing the above.  Reinforced importance of maintaining adequate hydration and try to avoid any further use of nonsteroidals.    Thank you very much for allowing me to participate in the care of your patient.  If you have any questions please feel free to call.        Sincerely,   Ildefonso Aguilar MD   [facility], [facility], [place]  [address]  [place] [id number]    Document electronically generated by:  Ildefonso Aguilar MD

## 2024-07-18 ENCOUNTER — PATIENT MESSAGE (OUTPATIENT)
Dept: INTERNAL MEDICINE CLINIC | Facility: CLINIC | Age: 54
End: 2024-07-18

## 2024-07-18 NOTE — TELEPHONE ENCOUNTER
From: Key Song  To: Tere Collins Kateihsanzi  Sent: 7/18/2024 9:42 AM CDT  Subject: Refill requst     Good morning-     I have strated the wegovy and it is working - I used the last one today- if you can send a refill .     thanks Maylin

## 2024-08-15 ENCOUNTER — PATIENT MESSAGE (OUTPATIENT)
Dept: INTERNAL MEDICINE CLINIC | Facility: CLINIC | Age: 54
End: 2024-08-15

## 2024-08-16 NOTE — TELEPHONE ENCOUNTER
From: Key Song  To: Tere Cardoso  Sent: 8/15/2024 7:29 PM CDT  Subject: refill     Hello-     [facility]   [facility] [place]

## 2024-08-20 NOTE — TELEPHONE ENCOUNTER
Please review. Protocol Failed; No Protocol    Requested Prescriptions   Pending Prescriptions Disp Refills    semaglutide-weight management 1 MG/0.5ML Subcutaneous Solution Auto-injector 2 mL 0     Sig: Inject 0.5 mL (1 mg total) into the skin once a week for 4 doses.       There is no refill protocol information for this order              Recent Outpatient Visits              1 month ago Stage 3a chronic kidney disease (HCC)    [facility] [place]    Office Visit    2 months ago Class 1 obesity due to excess calories without serious comorbidity with body mass index (BMI) of 33.0 to 33.9 in adult    [facility], [address], [place] Tere Cardoso MD    Office Visit    2 months ago Hypothyroidism, unspecified type    [facility], [address], [place] Amie Cuenca APRN    Office Visit    4 months ago Chronic migraine without aura without status migrainosus, not intractable    [facility] [place], [place], [place] Tamie Gerber MD    Office Visit    7 months ago Wellness examination    [facility] ([place], [place])Amie Muñoz APRN    Office Visit

## 2024-09-17 ENCOUNTER — LAB ENCOUNTER (OUTPATIENT)
Dept: LAB | Facility: HOSPITAL | Age: 54
End: 2024-09-17
Attending: INTERNAL MEDICINE
Payer: COMMERCIAL

## 2024-09-17 DIAGNOSIS — E03.9 ACQUIRED HYPOTHYROIDISM: Primary | ICD-10-CM

## 2024-09-17 DIAGNOSIS — E78.2 MIXED HYPERLIPIDEMIA: ICD-10-CM

## 2024-09-17 DIAGNOSIS — N18.31 STAGE 3A CHRONIC KIDNEY DISEASE (HCC): ICD-10-CM

## 2024-09-17 DIAGNOSIS — E03.9 HYPOTHYROIDISM, UNSPECIFIED TYPE: ICD-10-CM

## 2024-09-17 LAB
ALBUMIN SERPL-MCNC: 4.5 G/DL (ref 3.2–4.8)
ANION GAP SERPL CALC-SCNC: 7 MMOL/L (ref 0–18)
BASOPHILS # BLD AUTO: 0.08 X10(3) UL (ref 0–0.2)
BASOPHILS NFR BLD AUTO: 1.2 %
BILIRUB UR QL: NEGATIVE
BUN BLD-MCNC: 15 MG/DL (ref 9–23)
BUN/CREAT SERPL: 13.4 (ref 10–20)
C3 SERPL-MCNC: 154.1 MG/DL (ref 90–170)
C4 SERPL-MCNC: 37.8 MG/DL (ref 12–36)
CALCIUM BLD-MCNC: 9.7 MG/DL (ref 8.7–10.4)
CHLORIDE SERPL-SCNC: 107 MMOL/L (ref 98–112)
CHOLEST SERPL-MCNC: 181 MG/DL (ref ?–200)
CLARITY UR: CLEAR
CO2 SERPL-SCNC: 27 MMOL/L (ref 21–32)
CREAT BLD-MCNC: 1.12 MG/DL
CREAT UR-SCNC: 159.6 MG/DL
CRP SERPL-MCNC: <0.4 MG/DL (ref ?–1)
DEPRECATED RDW RBC AUTO: 40 FL (ref 35.1–46.3)
EGFRCR SERPLBLD CKD-EPI 2021: 59 ML/MIN/1.73M2 (ref 60–?)
EOSINOPHIL # BLD AUTO: 0.33 X10(3) UL (ref 0–0.7)
EOSINOPHIL NFR BLD AUTO: 4.9 %
ERYTHROCYTE [DISTWIDTH] IN BLOOD BY AUTOMATED COUNT: 12.3 % (ref 11–15)
ERYTHROCYTE [SEDIMENTATION RATE] IN BLOOD: 31 MM/HR
FASTING PATIENT LIPID ANSWER: YES
GLUCOSE BLD-MCNC: 85 MG/DL (ref 70–99)
GLUCOSE UR-MCNC: NORMAL MG/DL
HCT VFR BLD AUTO: 41.5 %
HDLC SERPL-MCNC: 38 MG/DL (ref 40–59)
HGB BLD-MCNC: 14.1 G/DL
HGB UR QL STRIP.AUTO: NEGATIVE
IMM GRANULOCYTES # BLD AUTO: 0.02 X10(3) UL (ref 0–1)
IMM GRANULOCYTES NFR BLD: 0.3 %
KETONES UR-MCNC: NEGATIVE MG/DL
LDLC SERPL CALC-MCNC: 113 MG/DL (ref ?–100)
LEUKOCYTE ESTERASE UR QL STRIP.AUTO: 75
LYMPHOCYTES # BLD AUTO: 1.96 X10(3) UL (ref 1–4)
LYMPHOCYTES NFR BLD AUTO: 29.4 %
MCH RBC QN AUTO: 30.7 PG (ref 26–34)
MCHC RBC AUTO-ENTMCNC: 34 G/DL (ref 31–37)
MCV RBC AUTO: 90.2 FL
MICROALBUMIN UR-MCNC: <0.3 MG/DL
MONOCYTES # BLD AUTO: 0.82 X10(3) UL (ref 0.1–1)
MONOCYTES NFR BLD AUTO: 12.3 %
NEUTROPHILS # BLD AUTO: 3.46 X10 (3) UL (ref 1.5–7.7)
NEUTROPHILS # BLD AUTO: 3.46 X10(3) UL (ref 1.5–7.7)
NEUTROPHILS NFR BLD AUTO: 51.9 %
NITRITE UR QL STRIP.AUTO: NEGATIVE
NONHDLC SERPL-MCNC: 143 MG/DL (ref ?–130)
OSMOLALITY SERPL CALC.SUM OF ELEC: 292 MOSM/KG (ref 275–295)
PH UR: 5.5 [PH] (ref 5–8)
PHOSPHATE SERPL-MCNC: 3.3 MG/DL (ref 2.4–5.1)
PLATELET # BLD AUTO: 291 10(3)UL (ref 150–450)
POTASSIUM SERPL-SCNC: 4.7 MMOL/L (ref 3.5–5.1)
PROT UR-MCNC: 7.6 MG/DL (ref ?–14)
PROT UR-MCNC: NEGATIVE MG/DL
RBC # BLD AUTO: 4.6 X10(6)UL
RHEUMATOID FACT SERPL-ACNC: 4.6 IU/ML (ref ?–14)
SODIUM SERPL-SCNC: 141 MMOL/L (ref 136–145)
SP GR UR STRIP: 1.02 (ref 1–1.03)
T3FREE SERPL-MCNC: 2.83 PG/ML (ref 2.4–4.2)
T4 FREE SERPL-MCNC: 1.3 NG/DL (ref 0.8–1.7)
TRIGL SERPL-MCNC: 172 MG/DL (ref 30–149)
TSI SER-ACNC: 0.39 MIU/ML (ref 0.55–4.78)
UROBILINOGEN UR STRIP-ACNC: NORMAL
VLDLC SERPL CALC-MCNC: 30 MG/DL (ref 0–30)
WBC # BLD AUTO: 6.7 X10(3) UL (ref 4–11)

## 2024-09-17 PROCEDURE — 82570 ASSAY OF URINE CREATININE: CPT

## 2024-09-17 PROCEDURE — 80061 LIPID PANEL: CPT

## 2024-09-17 PROCEDURE — 86160 COMPLEMENT ANTIGEN: CPT

## 2024-09-17 PROCEDURE — 84166 PROTEIN E-PHORESIS/URINE/CSF: CPT

## 2024-09-17 PROCEDURE — 85025 COMPLETE CBC W/AUTO DIFF WBC: CPT

## 2024-09-17 PROCEDURE — 86335 IMMUNFIX E-PHORSIS/URINE/CSF: CPT

## 2024-09-17 PROCEDURE — 85652 RBC SED RATE AUTOMATED: CPT

## 2024-09-17 PROCEDURE — 84481 FREE ASSAY (FT-3): CPT

## 2024-09-17 PROCEDURE — 81001 URINALYSIS AUTO W/SCOPE: CPT

## 2024-09-17 PROCEDURE — 86140 C-REACTIVE PROTEIN: CPT

## 2024-09-17 PROCEDURE — 84443 ASSAY THYROID STIM HORMONE: CPT

## 2024-09-17 PROCEDURE — 86431 RHEUMATOID FACTOR QUANT: CPT

## 2024-09-17 PROCEDURE — 84439 ASSAY OF FREE THYROXINE: CPT

## 2024-09-17 PROCEDURE — 36415 COLL VENOUS BLD VENIPUNCTURE: CPT

## 2024-09-17 PROCEDURE — 84156 ASSAY OF PROTEIN URINE: CPT

## 2024-09-17 PROCEDURE — 86038 ANTINUCLEAR ANTIBODIES: CPT

## 2024-09-17 PROCEDURE — 84165 PROTEIN E-PHORESIS SERUM: CPT

## 2024-09-17 PROCEDURE — 80069 RENAL FUNCTION PANEL: CPT

## 2024-09-17 PROCEDURE — 82043 UR ALBUMIN QUANTITATIVE: CPT

## 2024-09-17 RX ORDER — LEVOTHYROXINE SODIUM 75 UG/1
75 TABLET ORAL
Qty: 90 TABLET | Refills: 1 | Status: SHIPPED | OUTPATIENT
Start: 2024-09-17 | End: 2024-09-19

## 2024-09-19 DIAGNOSIS — E03.9 ACQUIRED HYPOTHYROIDISM: Primary | ICD-10-CM

## 2024-09-19 LAB
ALBUMIN SERPL ELPH-MCNC: 4.08 G/DL (ref 3.75–5.21)
ALBUMIN/GLOB SERPL: 1.44 {RATIO} (ref 1–2)
ALPHA1 GLOB SERPL ELPH-MCNC: 0.26 G/DL (ref 0.19–0.46)
ALPHA2 GLOB SERPL ELPH-MCNC: 0.88 G/DL (ref 0.48–1.05)
B-GLOBULIN SERPL ELPH-MCNC: 0.88 G/DL (ref 0.68–1.23)
GAMMA GLOB SERPL ELPH-MCNC: 0.81 G/DL (ref 0.62–1.7)
NUCLEAR IGG TITR SER IF: NEGATIVE {TITER}
PROT SERPL-MCNC: 6.9 G/DL (ref 5.7–8.2)

## 2024-09-19 RX ORDER — LEVOTHYROXINE SODIUM 75 UG/1
75 TABLET ORAL
Qty: 90 TABLET | Refills: 1 | Status: SHIPPED | OUTPATIENT
Start: 2024-09-19

## 2024-09-21 ENCOUNTER — TELEPHONE (OUTPATIENT)
Dept: NEPHROLOGY | Facility: CLINIC | Age: 54
End: 2024-09-21

## 2024-09-21 NOTE — TELEPHONE ENCOUNTER
Labs are all in but she still has not scheduled the kidney ultrasound.  Have her do as soon as possible and then see me for follow-up.

## 2024-09-23 NOTE — TELEPHONE ENCOUNTER
Dr Aguilar informed patient of note below  verified verbalized understanding ,reported is  aware need US but a lot things going on with pt father .

## 2024-09-30 ENCOUNTER — PATIENT MESSAGE (OUTPATIENT)
Dept: INTERNAL MEDICINE CLINIC | Facility: CLINIC | Age: 54
End: 2024-09-30

## 2024-10-01 NOTE — TELEPHONE ENCOUNTER
From: Key Song  To: Tere Cardoso  Sent: 9/30/2024 11:32 AM CDT  Subject: Wegovy refill    Good morning-  I need to refill the wegovy - currently I am on the 1.0 mg . Please send the script to [facility].

## 2024-10-02 NOTE — TELEPHONE ENCOUNTER
Please review. Protocol Failed; No Protocol    Requested Prescriptions   Pending Prescriptions Disp Refills    semaglutide-weight management 1 MG/0.5ML Subcutaneous Solution Auto-injector 2 mL 0     Sig: Inject 0.5 mL (1 mg total) into the skin once a week for 4 doses.       There is no refill protocol information for this order              Recent Outpatient Visits              3 months ago Stage 3a chronic kidney disease (HCC)    [facility], [facility], [place] Ildefonso Aguilar MD    Office Visit    3 months ago Class 1 obesity due to excess calories without serious comorbidity with body mass index (BMI) of 33.0 to 33.9 in adult    [facility], [address], [place] Tere Cardoso MD    Office Visit    3 months ago Hypothyroidism, unspecified type    [facility], [address], [place] Amie Cuenca APRN    Office Visit    6 months ago Chronic migraine without aura without status migrainosus, not intractable    [facility]    Office Visit    8 months ago [facility]    [facility] Visit

## 2024-11-11 ENCOUNTER — PATIENT MESSAGE (OUTPATIENT)
Dept: INTERNAL MEDICINE CLINIC | Facility: CLINIC | Age: 54
End: 2024-11-11

## 2024-11-11 ENCOUNTER — TELEPHONE (OUTPATIENT)
Dept: INTERNAL MEDICINE CLINIC | Facility: CLINIC | Age: 54
End: 2024-11-11

## 2024-11-11 NOTE — TELEPHONE ENCOUNTER
Closed   11/11/2024 11:47 AM  Close reason: Prior Authorization not required for patient/medication   Note from payer: Clinical Override not needed - Prescriber details have been updated to match the prescriber directory.   Payer: [facility] DELIVERY    [phone]

## 2024-12-02 ENCOUNTER — OFFICE VISIT (OUTPATIENT)
Dept: INTERNAL MEDICINE CLINIC | Facility: CLINIC | Age: 54
End: 2024-12-02

## 2024-12-02 ENCOUNTER — LAB ENCOUNTER (OUTPATIENT)
Dept: LAB | Age: 54
End: 2024-12-02
Attending: NURSE PRACTITIONER
Payer: COMMERCIAL

## 2024-12-02 VITALS
HEART RATE: 60 BPM | DIASTOLIC BLOOD PRESSURE: 77 MMHG | SYSTOLIC BLOOD PRESSURE: 137 MMHG | WEIGHT: 180 LBS | BODY MASS INDEX: 28 KG/M2 | TEMPERATURE: 97 F | RESPIRATION RATE: 16 BRPM | OXYGEN SATURATION: 97 %

## 2024-12-02 DIAGNOSIS — J01.90 ACUTE NON-RECURRENT SINUSITIS, UNSPECIFIED LOCATION: Primary | ICD-10-CM

## 2024-12-02 DIAGNOSIS — E03.9 ACQUIRED HYPOTHYROIDISM: ICD-10-CM

## 2024-12-02 LAB
T4 FREE SERPL-MCNC: 1 NG/DL (ref 0.8–1.7)
TSI SER-ACNC: 6.36 UIU/ML (ref 0.55–4.78)

## 2024-12-02 PROCEDURE — 84439 ASSAY OF FREE THYROXINE: CPT

## 2024-12-02 PROCEDURE — 84443 ASSAY THYROID STIM HORMONE: CPT

## 2024-12-02 PROCEDURE — 36415 COLL VENOUS BLD VENIPUNCTURE: CPT

## 2024-12-02 RX ORDER — PREDNISONE 20 MG/1
TABLET ORAL
Qty: 7 TABLET | Refills: 0 | Status: SHIPPED | OUTPATIENT
Start: 2024-12-02 | End: 2024-12-08

## 2024-12-02 RX ORDER — BENZONATATE 200 MG/1
200 CAPSULE ORAL 3 TIMES DAILY PRN
Qty: 30 CAPSULE | Refills: 0 | Status: SHIPPED | OUTPATIENT
Start: 2024-12-02

## 2024-12-02 NOTE — PROGRESS NOTES
Key Song is a 54 year old female.  HPI:   Pt reports cold sx for the past 3 days. She reports sinus pressure and pain, mostly around nose and frontal. Has HA's. Had body aches and fatigue 3 days ago. Cough is productive.   Denies any CP, SOB, fever, chills, loss of taste or smell, dizziness, abdominal pain, NVDC. Hx of sinus surgery 4 years ago. Reports sinus infection once a year.  No seasonal allergies reported.   Current Outpatient Medications   Medication Sig Dispense Refill    predniSONE 20 MG Oral Tab Take 1 tablet (20 mg total) by mouth 2 (two) times daily for 2 days, THEN 1 tablet (20 mg total) daily for 2 days, THEN 0.5 tablets (10 mg total) daily for 2 days. 7 tablet 0    benzonatate 200 MG Oral Cap Take 1 capsule (200 mg total) by mouth 3 (three) times daily as needed for cough. 30 capsule 0    semaglutide-weight management 1.7 MG/0.75ML Subcutaneous Solution Auto-injector Inject 0.75 mL (1.7 mg total) into the skin once a week for 8 doses. 3 mL 1    levothyroxine 75 MCG Oral Tab Take 1 tablet (75 mcg total) by mouth before breakfast. 90 tablet 1    Galcanezumab-gnlm (EMGALITY) 120 MG/ML Subcutaneous Solution Auto-injector every 30 days with maintenance dose of 120mg. 1 each 5    atorvastatin 40 MG Oral Tab Take 1 tablet (40 mg total) by mouth once daily. 90 tablet 3      Past Medical History:    Abdominal distention    Abdominal pain    Anxiety state    Bloating    Blood in the stool    Body piercing    Constipation    Disorder of thyroid    Diverticulosis    Flatulence/gas pain/belching    Frequent use of laxatives    GERD (gastroesophageal reflux disease)    on meds    Heartburn    Hematometra    History of depression    Hypercholesteremia    on simvastatin in past, new med 2012    HYPOTHYROIDISM    on meds    IBS    colonoscopy    Irregular bowel habits    MIGRAINES    on inderal - Dr Mcwilliams    Migraines    Nausea    MARTIN (obstructive sleep apnea)    AHI 26.9 Supine AHI 45.2 non-supine AHI  19.9 [place] 86%    Pain with bowel movements    PONV (postoperative nausea and vomiting)    Sleep apnea    cpap    Thyroid disease    Visual impairment    glasses    Wears glasses    Weight gain      Social History:  Social History     Socioeconomic History    Marital status:    Tobacco Use    Smoking status: Former     Current packs/day: 0.00     Average packs/day: 1 pack/day for 20.0 years (20.0 ttl pk-yrs)     Types: Cigarettes     Start date: 1986     Quit date: 2006     Years since quittin.9    Smokeless tobacco: Never    Tobacco comments:     15 pk yrs   Vaping Use    Vaping status: Never Used   Substance and Sexual Activity    Alcohol use: No     Alcohol/week: 0.0 standard drinks of alcohol    Drug use: No    Sexual activity: Yes     Partners: Male     Birth control/protection: Surgical     Comment: BTL    Other Topics Concern    Caffeine Concern Yes     Comment: 1-2 cups coffee per day    Exercise No        REVIEW OF SYSTEMS:   Review of Systems   All other systems reviewed and are negative.         EXAM:   /77 (BP Location: Left arm, Patient Position: Sitting, Cuff Size: large)   Pulse 60   Temp 96.8 °F (36 °C) (Temporal)   Resp 16   Wt 180 lb (81.6 kg)   SpO2 97%   BMI 28.19 kg/m²     Physical Exam  Vitals reviewed.   Constitutional:       General: She is not in acute distress.     Appearance: Normal appearance. She is normal weight. She is not ill-appearing.   HENT:      Head: Normocephalic and atraumatic.      Right Ear: Tympanic membrane, ear canal and external ear normal.      Left Ear: Tympanic membrane, ear canal and external ear normal.      Nose: Congestion present.      Mouth/Throat:      Pharynx: Oropharynx is clear. No oropharyngeal exudate or posterior oropharyngeal erythema.   Eyes:      General: No scleral icterus.        Right eye: No discharge.         Left eye: No discharge.      Extraocular Movements: Extraocular movements intact.       Conjunctiva/sclera: Conjunctivae normal.      Pupils: Pupils are equal, round, and reactive to light.   Neck:      Thyroid: No thyroid mass or thyromegaly.   Cardiovascular:      Rate and Rhythm: Normal rate and regular rhythm.      Pulses: Normal pulses.      Heart sounds: Normal heart sounds.   Pulmonary:      Effort: Pulmonary effort is normal. No respiratory distress.      Breath sounds: Normal breath sounds. No stridor. No wheezing, rhonchi or rales.   Chest:      Chest wall: No tenderness.   Musculoskeletal:         General: Normal range of motion.      Cervical back: Normal range of motion and neck supple. No rigidity or tenderness.      Right lower leg: No edema.      Left lower leg: No edema.   Lymphadenopathy:      Cervical: No cervical adenopathy.   Skin:     General: Skin is warm and dry.      Coloration: Skin is not jaundiced.   Neurological:      General: No focal deficit present.      Mental Status: She is alert and oriented to person, place, and time.      Motor: Motor function is intact.      Gait: Gait normal.   Psychiatric:         Mood and Affect: Mood normal.         Judgment: Judgment normal.            ASSESSMENT AND PLAN:   1. Acute non-recurrent sinusitis, unspecified location  Nasal saline 2 sprays in each nostril every 3-4 hours as needed.   Flonase 1-2 sprays in each nostril once daily. Use 3-5 days  Tylenol 500 mg every 6-8 hours as needed (max dose 3000 mg/day).   Hydrate, humidifier, rest, honey/elderberry   -Trial of tapered steroid burst and benzonatate.   - predniSONE 20 MG Oral Tab; Take 1 tablet (20 mg total) by mouth 2 (two) times daily for 2 days, THEN 1 tablet (20 mg total) daily for 2 days, THEN 0.5 tablets (10 mg total) daily for 2 days.  Dispense: 7 tablet; Refill: 0  - benzonatate 200 MG Oral Cap; Take 1 capsule (200 mg total) by mouth 3 (three) times daily as needed for cough.  Dispense: 30 capsule; Refill: 0       The patient indicates understanding of these issues and  agrees to the plan.  The patient is asked to return in PRN.     The above note was creating using Dragon speech recognition technology. Please excuse any typos.

## 2024-12-05 DIAGNOSIS — E03.9 HYPOTHYROIDISM, UNSPECIFIED TYPE: Primary | ICD-10-CM

## 2024-12-05 RX ORDER — LEVOTHYROXINE SODIUM 88 UG/1
88 TABLET ORAL
Qty: 90 TABLET | Refills: 1 | Status: SHIPPED | OUTPATIENT
Start: 2024-12-05

## 2024-12-09 ENCOUNTER — PATIENT MESSAGE (OUTPATIENT)
Dept: INTERNAL MEDICINE CLINIC | Facility: CLINIC | Age: 54
End: 2024-12-09

## 2024-12-10 NOTE — TELEPHONE ENCOUNTER
Dr. Cardoso please advise the patient would like to increase her dose she is on 1.7mg of Semaglutide.

## 2024-12-23 ENCOUNTER — OFFICE VISIT (OUTPATIENT)
Dept: INTERNAL MEDICINE CLINIC | Facility: CLINIC | Age: 54
End: 2024-12-23

## 2024-12-23 VITALS
SYSTOLIC BLOOD PRESSURE: 130 MMHG | WEIGHT: 185.13 LBS | HEART RATE: 65 BPM | TEMPERATURE: 98 F | BODY MASS INDEX: 29.06 KG/M2 | DIASTOLIC BLOOD PRESSURE: 83 MMHG | OXYGEN SATURATION: 99 % | HEIGHT: 67 IN

## 2024-12-23 DIAGNOSIS — J01.90 ACUTE NON-RECURRENT SINUSITIS, UNSPECIFIED LOCATION: Primary | ICD-10-CM

## 2024-12-23 RX ORDER — AZITHROMYCIN 250 MG/1
TABLET, FILM COATED ORAL
Qty: 6 TABLET | Refills: 0 | Status: SHIPPED | OUTPATIENT
Start: 2024-12-23 | End: 2024-12-28

## 2024-12-23 RX ORDER — PREDNISONE 20 MG/1
20 TABLET ORAL 2 TIMES DAILY
Qty: 10 TABLET | Refills: 0 | Status: SHIPPED | OUTPATIENT
Start: 2024-12-23 | End: 2024-12-28

## 2024-12-23 NOTE — PROGRESS NOTES
Key Song is a 54 year old female.  HPI:   Pt c/o sinus pressure and pain for the past 4-5 days. Pain is mostly frontal. Has post nasal drainage. Using nasal saline and flonase which is helping. Has a productive cough. No wheezing, CP, SOB, HA, dizziness, NVDC, abdominal pain, runnyse. Did not test for covid.   Pt recently tx 3 weeks ago for sinusitis with steroid burst, nasal saline, tylenol. Felt better after 3 days on tx and sx resolved.   Current Outpatient Medications   Medication Sig Dispense Refill    azithromycin (ZITHROMAX Z-JON) 250 MG Oral Tab Take 2 tablets (500 mg total) by mouth daily for 1 day, THEN 1 tablet (250 mg total) daily for 4 days. 6 tablet 0    predniSONE 20 MG Oral Tab Take 1 tablet (20 mg total) by mouth 2 (two) times daily for 5 days. 10 tablet 0    semaglutide-weight management 2.4 MG/0.75ML Subcutaneous Solution Auto-injector Inject 0.75 mL (2.4 mg total) into the skin once a week for 12 doses. 3 mL 2    levothyroxine 88 MCG Oral Tab Take 1 tablet (88 mcg total) by mouth before breakfast. 90 tablet 1    benzonatate 200 MG Oral Cap Take 1 capsule (200 mg total) by mouth 3 (three) times daily as needed for cough. 30 capsule 0    Galcanezumab-gnlm (EMGALITY) 120 MG/ML Subcutaneous Solution Auto-injector every 30 days with maintenance dose of 120mg. 1 each 5    atorvastatin 40 MG Oral Tab Take 1 tablet (40 mg total) by mouth once daily. 90 tablet 3    levothyroxine 75 MCG Oral Tab Take 1 tablet (75 mcg total) by mouth before breakfast. (Patient not taking: Reported on 12/23/2024) 90 tablet 1      Past Medical History:    Abdominal distention    Abdominal pain    Anxiety state    Bloating    Blood in the stool    Body piercing    Constipation    Disorder of thyroid    Diverticulosis    Flatulence/gas pain/belching    Frequent use of laxatives    GERD (gastroesophageal reflux disease)    on meds    Heartburn    Hematometra    History of depression    Hypercholesteremia    on  simvastatin in past, new med 2012    HYPOTHYROIDISM    on meds    IBS    colonoscopy    Irregular bowel habits    MIGRAINES    on inderal - Dr Mcwilliams    Migraines    Nausea    MARTIN (obstructive sleep apnea)    AHI 26.9 Supine AHI 45.2 non-supine AHI 19.9 [place] 86%    Pain with bowel movements    PONV (postoperative nausea and vomiting)    Sleep apnea    cpap    Thyroid disease    Visual impairment    glasses    Wears glasses    Weight gain      Social History:  Social History     Socioeconomic History    Marital status:    Tobacco Use    Smoking status: Former     Current packs/day: 0.00     Average packs/day: 1 pack/day for 20.0 years (20.0 ttl pk-yrs)     Types: Cigarettes     Start date: 1986     Quit date: 2006     Years since quittin.9     Passive exposure: Past    Smokeless tobacco: Never    Tobacco comments:     15 pk yrs   Vaping Use    Vaping status: Never Used   Substance and Sexual Activity    Alcohol use: No     Alcohol/week: 0.0 standard drinks of alcohol    Drug use: No    Sexual activity: Yes     Partners: Male     Birth control/protection: Surgical     Comment: BTL    Other Topics Concern    Caffeine Concern Yes     Comment: 1-2 cups coffee per day    Exercise No        REVIEW OF SYSTEMS:   Review of Systems   All other systems reviewed and are negative.         EXAM:   /83 (BP Location: Right arm, Patient Position: Sitting, Cuff Size: adult)   Pulse 65   Temp 98.1 °F (36.7 °C) (Tympanic)   Ht 5' 7\" (1.702 m)   Wt 185 lb 1.6 oz (84 kg)   SpO2 99%   BMI 28.99 kg/m²     Physical Exam  Vitals reviewed.   Constitutional:       General: She is not in acute distress.     Appearance: Normal appearance. She is normal weight. She is not ill-appearing.   HENT:      Head: Normocephalic and atraumatic.      Right Ear: Tympanic membrane, ear canal and external ear normal.      Left Ear: Tympanic membrane, ear canal and external ear normal.      Nose: Nose normal. No  congestion or rhinorrhea.      Mouth/Throat:      Pharynx: Oropharynx is clear. No oropharyngeal exudate or posterior oropharyngeal erythema.   Eyes:      General: No scleral icterus.        Right eye: No discharge.         Left eye: No discharge.      Extraocular Movements: Extraocular movements intact.      Conjunctiva/sclera: Conjunctivae normal.      Pupils: Pupils are equal, round, and reactive to light.   Neck:      Thyroid: No thyroid mass or thyromegaly.   Cardiovascular:      Rate and Rhythm: Normal rate and regular rhythm.      Pulses: Normal pulses.      Heart sounds: Normal heart sounds.   Pulmonary:      Effort: Pulmonary effort is normal. No respiratory distress.      Breath sounds: Normal breath sounds. No stridor. No wheezing, rhonchi or rales.   Chest:      Chest wall: No tenderness.   Abdominal:      General: Abdomen is flat. Bowel sounds are normal. There is no distension.      Palpations: Abdomen is soft. There is no mass.      Tenderness: There is no abdominal tenderness.   Musculoskeletal:         General: Normal range of motion.      Cervical back: Normal range of motion and neck supple. No rigidity or tenderness.   Lymphadenopathy:      Cervical: No cervical adenopathy.   Skin:     General: Skin is warm.      Coloration: Skin is not jaundiced.   Neurological:      Mental Status: She is alert and oriented to person, place, and time.      Motor: Motor function is intact.      Gait: Gait normal.   Psychiatric:         Mood and Affect: Mood normal.         Judgment: Judgment normal.            ASSESSMENT AND PLAN:   1. Acute non-recurrent sinusitis, unspecified location  Nasal saline 2 sprays in each nostril every 3-4 hours as needed.   Flonase 1-2 sprays in each nostril once daily.   Tylenol 500 mg every 6-8 hours as needed (max dose 3000 mg/day).   Hydrate, humidifier, rest, honey/elderberry   -start zpak and steroid burst. Reviewed dose and s/e.   - azithromycin (ZITHROMAX Z-JON) 250 MG Oral  Tab; Take 2 tablets (500 mg total) by mouth daily for 1 day, THEN 1 tablet (250 mg total) daily for 4 days.  Dispense: 6 tablet; Refill: 0  - predniSONE 20 MG Oral Tab; Take 1 tablet (20 mg total) by mouth 2 (two) times daily for 5 days.  Dispense: 10 tablet; Refill: 0       The patient indicates understanding of these issues and agrees to the plan.  The patient is asked to return in PRN.     The above note was creating using Dragon speech recognition technology. Please excuse any typos.

## 2025-02-25 ENCOUNTER — OFFICE VISIT (OUTPATIENT)
Dept: NEUROLOGY | Facility: CLINIC | Age: 55
End: 2025-02-25
Payer: COMMERCIAL

## 2025-02-25 VITALS
HEIGHT: 67 IN | RESPIRATION RATE: 16 BRPM | SYSTOLIC BLOOD PRESSURE: 122 MMHG | BODY MASS INDEX: 29.03 KG/M2 | WEIGHT: 185 LBS | OXYGEN SATURATION: 100 % | HEART RATE: 64 BPM | DIASTOLIC BLOOD PRESSURE: 80 MMHG

## 2025-02-25 DIAGNOSIS — G43.709 CHRONIC MIGRAINE WITHOUT AURA WITHOUT STATUS MIGRAINOSUS, NOT INTRACTABLE: Primary | ICD-10-CM

## 2025-02-25 PROCEDURE — 99213 OFFICE O/P EST LOW 20 MIN: CPT | Performed by: OTHER

## 2025-02-25 RX ORDER — GALCANEZUMAB 120 MG/ML
INJECTION, SOLUTION SUBCUTANEOUS
Qty: 1 EACH | Refills: 11 | Status: SHIPPED | OUTPATIENT
Start: 2025-02-25

## 2025-02-25 NOTE — PATIENT INSTRUCTIONS
Refill policies:    Allow 2-3 business days for refills; controlled substances may take longer.  Contact your pharmacy at least 5 days prior to running out of medication and have them send an electronic request or submit request through the “request refill” option in your [employer] account.  Refills are not addressed on weekends; covering physicians do not authorize routine medications on weekends.  No narcotics or controlled substances are refilled after noon on Fridays or by on call physicians.  By law, narcotics must be electronically prescribed.  A 30 day supply with no refills is the maximum allowed.  If your prescription is due for a refill, you may be due for a follow up appointment.  To best provide you care, patients receiving routine medications need to be seen at least once a year.  Patients receiving narcotic/controlled substance medications need to be seen at least once every 3 months.  In the event that your preferred pharmacy does not have the requested medication in stock (e.g. Backordered), it is your responsibility to find another pharmacy that has the requested medication available.  We will gladly send a new prescription to that pharmacy at your request.    Scheduling Tests:    If your physician has ordered radiology tests such as MRI or CT scans, please contact Central Scheduling at [phone] right away to schedule the test.  Once scheduled, the [facility] Centralized Referral Team will work with your insurance carrier to obtain pre-certification or prior authorization.  Depending on your insurance carrier, approval may take 3-10 days.  It is highly recommended patients assure they have received an authorization before having a test performed.  If test is done without insurance authorization, patient may be responsible for the entire amount billed.      Precertification and Prior Authorizations:  If your physician has recommended that you have a procedure or additional testing performed the [facility]  Centralized Referral Team will contact your insurance carrier to obtain pre-certification or prior authorization.    You are strongly encouraged to contact your insurance carrier to verify that your procedure/test has been approved and is a COVERED benefit.  Although the [facility] Centralized Referral Team does its due diligence, the insurance carrier gives the disclaimer that \"Although the procedure is authorized, this does not guarantee payment.\"    Ultimately the patient is responsible for payment.   Thank you for your understanding in this matter.  Paperwork Completion:  If you require FMLA or disability paperwork for your recovery, please make sure to either drop it off or have it faxed to our office at [phone]. Be sure the form has your name and date of birth on it.  The form will be faxed to our Forms Department and they will complete it for you.  There is a 25$ fee for all forms that need to be filled out.  Please be aware there is a 10-14 day turnaround time.  You will need to sign a release of information (DIVYA) form if your paperwork does not come with one.  You may call the Forms Department with any questions at [phone].  Their fax number is [phone].

## 2025-02-25 NOTE — PROGRESS NOTES
HPI:    Patient ID: Key Song is a 54 year old female.  PCP: Dr Ye    Follow up visit : migraines      Patient is a pleasant 54  year old who presents for follow up for migraines. LOV 2024  Reports no change in her headaches, stable on Emgality monthly injections  No new complaints.      HISTORY:  Past Medical History:    Abdominal distention    Abdominal pain    Anxiety state    Bloating    Blood in the stool    Body piercing    Constipation    Disorder of thyroid    Diverticulosis    Flatulence/gas pain/belching    Frequent use of laxatives    GERD (gastroesophageal reflux disease)    on meds    Heartburn    Hematometra    History of depression    Hypercholesteremia    on simvastatin in past, new med 2012    HYPOTHYROIDISM    on meds    IBS    colonoscopy    Irregular bowel habits    MIGRAINES    on inderal - Dr Mcwilliams    Migraines    Nausea    MARTIN (obstructive sleep apnea)    AHI 26.9 Supine AHI 45.2 non-supine AHI 19.9 [place] 86%    Pain with bowel movements    PONV (postoperative nausea and vomiting)    Sleep apnea    cpap    Thyroid disease    Visual impairment    glasses    Wears glasses    Weight gain      Past Surgical History:   Procedure Laterality Date    Ablation      endometrial ablation - Dr. Allen          First pregnancy, then 2     Colonoscopy      Colonoscopy,diagnostic  2/3/2011    Performed by YENI ASTORGA at [facility], [facility]    Mri brain      Other surgical history Bilateral     knee surgery - scope - ?? findings    Sinus surgery    2021    Tubal ligation      Post partum after last preg      Family History   Problem Relation Age of Onset    Diabetes Mother     Hypertension Mother     Lipids Mother     Heart Attack Mother     Stroke Mother     Mental Disorder Mother     Other (hypothyroidism) Mother     Heart Attack Father     Other (kidney disease) Father     Ovarian Cancer Maternal Cousin Female 53    No Known Problems  Daughter     No Known Problems Son     No Known Problems Sister     No Known Problems Brother     No Known Problems Daughter     No Known Problems Sister     No Known Problems Brother     No Known Problems Brother       Social History     Socioeconomic History    Marital status:    Tobacco Use    Smoking status: Former     Current packs/day: 0.00     Average packs/day: 1 pack/day for 20.0 years (20.0 ttl pk-yrs)     Types: Cigarettes     Start date: 1986     Quit date: 2006     Years since quittin.1     Passive exposure: Past    Smokeless tobacco: Never    Tobacco comments:     15 pk yrs   Vaping Use    Vaping status: Never Used   Substance and Sexual Activity    Alcohol use: No     Alcohol/week: 0.0 standard drinks of alcohol    Drug use: No    Sexual activity: Yes     Partners: Male     Birth control/protection: Surgical     Comment: BTL    Other Topics Concern    Caffeine Concern Yes     Comment: 1-2 cups coffee per day    Exercise No            Review of Systems   Constitutional: Negative.    Eyes: Negative.    Respiratory: Negative.     Cardiovascular: Negative.    Gastrointestinal: Negative.  Negative for constipation.   Endocrine: Negative.    Genitourinary: Negative.    Musculoskeletal: Negative.  Negative for neck pain.   Skin: Negative.    Allergic/Immunologic: Negative.    Neurological: Negative.  Negative for dizziness and headaches.   Hematological: Negative.    Psychiatric/Behavioral: Negative.     All other systems reviewed and are negative.           Current Outpatient Medications   Medication Sig Dispense Refill    Galcanezumab-gnlm (EMGALITY) 120 MG/ML Subcutaneous Solution Auto-injector every 30 days with maintenance dose of 120mg. 1 each 11    semaglutide-weight management 2.4 MG/0.75ML Subcutaneous Solution Auto-injector Inject 0.75 mL (2.4 mg total) into the skin once a week for 12 doses. 3 mL 2    benzonatate 200 MG Oral Cap Take 1 capsule (200 mg total) by mouth 3  (three) times daily as needed for cough. 30 capsule 0    levothyroxine 75 MCG Oral Tab Take 1 tablet (75 mcg total) by mouth before breakfast. 90 tablet 1    atorvastatin 40 MG Oral Tab Take 1 tablet (40 mg total) by mouth once daily. 90 tablet 3    levothyroxine 88 MCG Oral Tab Take 1 tablet (88 mcg total) by mouth before breakfast. (Patient not taking: Reported on 2/25/2025) 90 tablet 1     Allergies:  Allergies   Allergen Reactions    Pcn [Penicillins] RASH      PHYSICAL EXAM:   Physical Exam  Blood pressure 122/80, pulse 64, resp. rate 16, height 67\", weight 185 lb (83.9 kg), SpO2 100%, not currently breastfeeding.    General: sitting in the chair and in no apparent distress  HEENT: Normocephalic and atraumatic. No sinus tenderess  Cardiovascular: Normal rate, regular rhythm   Pulmonary/Chest: not tested  Psychiatric:  normal mood and affect.   Neurological   Awake, alert and oriented to time, place and person.   Speech is fluent with intact comprehension, repetition and naming.   Normal attention and memory. Higher cortical function intact.  Cranial nerves 2-12: grossly intact ( limited, patient has face mask on)  Sensory : not tested  Motor: moving all extremities equally  Gait: normal casual gait by observation           ASSESSMENT/PLAN:   (G43.709) Chronic migraine without aura without status migrainosus, not intractable  (primary encounter diagnosis)    Patient is a 54-year-old female who presented for follow-up for migraines.  She is stable overall  Continue current management    Continue Emgality for migraine prevention, needs PA.      Follow up in about 12 months   See orders and medications filed with this encounter. The patient indicates understanding of these issues and agrees with the plan.      Tamie Gerber MD  [facility] [place]            No orders of the defined types were placed in this encounter.      Meds This Visit:  Requested Prescriptions     Signed Prescriptions Disp Refills     Galcanezumab-gnlm (EMGALITY) 120 MG/ML Subcutaneous Solution Auto-injector 1 each 11     Sig: every 30 days with maintenance dose of 120mg.       Imaging & Referrals:  None       ID#[id number]      Migraine   Pertinent negatives include no dizziness or neck pain.

## 2025-03-03 ENCOUNTER — TELEPHONE (OUTPATIENT)
Dept: NEUROLOGY | Facility: CLINIC | Age: 55
End: 2025-03-03

## 2025-03-24 ENCOUNTER — LAB ENCOUNTER (OUTPATIENT)
Dept: LAB | Facility: HOSPITAL | Age: 55
End: 2025-03-24
Attending: NURSE PRACTITIONER
Payer: COMMERCIAL

## 2025-03-24 DIAGNOSIS — E03.9 HYPOTHYROIDISM, UNSPECIFIED TYPE: ICD-10-CM

## 2025-03-24 LAB — TSI SER-ACNC: 2.62 UIU/ML (ref 0.55–4.78)

## 2025-03-24 PROCEDURE — 84443 ASSAY THYROID STIM HORMONE: CPT

## 2025-03-24 PROCEDURE — 36415 COLL VENOUS BLD VENIPUNCTURE: CPT

## 2025-03-31 ENCOUNTER — PATIENT MESSAGE (OUTPATIENT)
Dept: INTERNAL MEDICINE CLINIC | Facility: CLINIC | Age: 55
End: 2025-03-31

## 2025-03-31 NOTE — TELEPHONE ENCOUNTER
Which GLP is the patient on: was on Wegovy, now requesting Zepbound  Current dose: Wegovy  was 2.4 mg/0.75ml subcutaneously weekly   Patient seeking refill or increase to next dose: Patient requesting Zepbound for insurance to cover  How many doses of current dose completed:   Side effects, if any:   Current weight / how much weight loss:   Last visit:  06/13/2024 with Dr. Cardoso, 12/23/24 with KENY Cuenca

## 2025-04-16 ENCOUNTER — OFFICE VISIT (OUTPATIENT)
Dept: INTERNAL MEDICINE CLINIC | Facility: CLINIC | Age: 55
End: 2025-04-16

## 2025-04-16 VITALS
BODY MASS INDEX: 29.19 KG/M2 | HEIGHT: 67 IN | DIASTOLIC BLOOD PRESSURE: 72 MMHG | WEIGHT: 186 LBS | HEART RATE: 60 BPM | SYSTOLIC BLOOD PRESSURE: 112 MMHG

## 2025-04-16 DIAGNOSIS — R73.03 PREDIABETES: ICD-10-CM

## 2025-04-16 DIAGNOSIS — E66.811 CLASS 1 OBESITY DUE TO EXCESS CALORIES WITHOUT SERIOUS COMORBIDITY WITH BODY MASS INDEX (BMI) OF 33.0 TO 33.9 IN ADULT: Primary | ICD-10-CM

## 2025-04-16 DIAGNOSIS — E78.2 MIXED HYPERLIPIDEMIA: ICD-10-CM

## 2025-04-16 DIAGNOSIS — E66.09 CLASS 1 OBESITY DUE TO EXCESS CALORIES WITHOUT SERIOUS COMORBIDITY WITH BODY MASS INDEX (BMI) OF 33.0 TO 33.9 IN ADULT: Primary | ICD-10-CM

## 2025-04-16 PROCEDURE — 99214 OFFICE O/P EST MOD 30 MIN: CPT | Performed by: INTERNAL MEDICINE

## 2025-04-16 RX ORDER — TIRZEPATIDE 2.5 MG/.5ML
2.5 INJECTION, SOLUTION SUBCUTANEOUS WEEKLY
Qty: 2 ML | Refills: 0 | Status: SHIPPED | OUTPATIENT
Start: 2025-04-16 | End: 2025-05-08

## 2025-04-16 NOTE — PROGRESS NOTES
Key Song is a 55 year old female.  Chief Complaint   Patient presents with    Weight Management     HPI:    Patient presented today for follow up. She states that she has been off of wegovy since about a month because it was not covered with insurance. Her weigh thas been slowly going up.    She has been monitoring her dietary intake and stays low carb.   Patient also tries to increase her physical activity as much as possible.   Had no side effects with wegovy except mild nausea    Current Medications[1]   Past Medical History[2]   Past Surgical History[3]   Social History:  Short Social Hx on File[4]   Family History[5]   Allergies[6]     REVIEW OF SYSTEMS:   Review of Systems   Review of Systems   Constitutional: Negative for activity change, appetite change and fever.   HENT: Negative for congestion and voice change.    Respiratory: Negative for cough and shortness of breath.    Cardiovascular: Negative for chest pain.   Gastrointestinal: Negative for abdominal distention, abdominal pain and vomiting.   Genitourinary: Negative for hematuria.   Skin: Negative for wound.   Psychiatric/Behavioral: Negative for behavioral problems.   Wt Readings from Last 5 Encounters:   04/16/25 186 lb (84.4 kg)   02/25/25 185 lb (83.9 kg)   12/23/24 185 lb 1.6 oz (84 kg)   12/02/24 180 lb (81.6 kg)   07/02/24 213 lb (96.6 kg)     Body mass index is 29.13 kg/m².      EXAM:   /72   Pulse 60   Ht 5' 7\" (1.702 m)   Wt 186 lb (84.4 kg)   BMI 29.13 kg/m²   Physical Exam   Constitutional:       Appearance: Normal appearance.   HENT:      Head: Normocephalic.   Eyes:      Conjunctiva/sclera: Conjunctivae normal.   Breast:  Normal bilateral breast exam. No palpable masses or nodules.   No nipples asymmetry or discharge. No skin changes   Cardiovascular:      Rate and Rhythm: Normal rate and regular rhythm.      Heart sounds: Normal heart sounds. No murmur heard.  Pulmonary:      Effort: Pulmonary effort is normal.       Breath sounds: Normal breath sounds. No rhonchi or rales.   Abdominal:      General: Bowel sounds are normal.      Palpations: Abdomen is soft.      Tenderness: There is no abdominal tenderness.   Musculoskeletal:      Cervical back: Neck supple.      Right lower leg: No edema.      Left lower leg: No edema.   Skin:     General: Skin is warm and dry.   Neurological:      General: No focal deficit present.      Mental Status: He is alert and oriented to person, place, and time. Mental status is at baseline.   Psychiatric:         Mood and Affect: Mood normal.         Behavior: Behavior normal.       ASSESSMENT AND PLAN:     Assessment & Plan  Class 1 obesity due to excess calories without serious comorbidity with body mass index (BMI) of 33.0 to 33.9 in adult  Nutritional Goals  Limit carbohydrates to <150 gms per day and Eat 3-4 cups of fresh fruits or vegetables daily     Behavior Modifications Reviewed and Discussed  Plan meals in advance, Read nutrition labels, Drink 64 oz of water per day, Utlize portion control strategies to reduce calorie intake, and Identify triggers for eating and manage cues     Exercise Goals Reviewed and Discussed    Increase consistency and time of walking   -change to zepbound 2.5  -  side effects including nausea, vomiting, diarrhea/constipattion, acute pancreatitis, dehydration, headaches, kidney disease, gall bladder disease, hypoglycemia. Also discussed the contraindication to be used in patients with personal or family history of medullary thyroid cancer or MEN2 syndrome discussed with the patient.    - Jac[facility]eline direct program also discussed  - If not able to get it covered, will recommend phentermine. Check EKG  Orders:    Tirzepatide-Weight Management (ZEPBOUND) 2.5 MG/0.5ML Subcutaneous Solution Auto-injector; Inject 2.5 mg into the skin once a week for 4 doses.    Comp Metabolic Panel (14); Future    Hemoglobin A1C [E]; Future    Mixed hyperlipidemia  - diet and lifestyle  modifications  - recheck labs   Orders:    Tirzepatide-Weight Management (ZEPBOUND) 2.5 MG/0.5ML Subcutaneous Solution Auto-injector; Inject 2.5 mg into the skin once a week for 4 doses.    Lipid Panel [E]; Future    Prediabetes  - will benefit from GLP 1  - unable to do metformin because of CKD III           The patient indicates understanding of these issues and agrees to the plan.  Follow up in 3 months     Tere Cardoso MD     This note was created by Dragon voice recognition. Errors in content may be related to improper recognition by the system; efforts to review and correct have been done but errors may still exist. Please be advised the primary purpose of this note is for me to communicate medical care. Standard sentence structure is not always used. Medical terminology and medical abbreviations may be used. There may be grammatical, typographical, and automated fill ins that may have errors missed in proofreading.        [1]   Current Outpatient Medications   Medication Sig Dispense Refill    Tirzepatide-Weight Management (ZEPBOUND) 2.5 MG/0.5ML Subcutaneous Solution Auto-injector Inject 2.5 mg into the skin once a week for 4 doses. 2 mL 0    levothyroxine 88 MCG Oral Tab Take 1 tablet (88 mcg total) by mouth before breakfast. 90 tablet 1    atorvastatin 40 MG Oral Tab Take 1 tablet (40 mg total) by mouth once daily. 90 tablet 3    Galcanezumab-gnlm (EMGALITY) 120 MG/ML Subcutaneous Solution Auto-injector every 30 days with maintenance dose of 120mg. 1 each 11    benzonatate 200 MG Oral Cap Take 1 capsule (200 mg total) by mouth 3 (three) times daily as needed for cough. (Patient not taking: Reported on 4/16/2025) 30 capsule 0    levothyroxine 75 MCG Oral Tab Take 1 tablet (75 mcg total) by mouth before breakfast. (Patient not taking: Reported on 4/16/2025) 90 tablet 1   [2]   Past Medical History:   Abdominal distention    Abdominal pain    Anxiety state    Bloating    Blood in the stool    Body  piercing    Constipation    Disorder of thyroid    Diverticulosis    Flatulence/gas pain/belching    Frequent use of laxatives    GERD (gastroesophageal reflux disease)    on meds    Heartburn    Hematometra    History of depression    Hypercholesteremia    on simvastatin in past, new med 2012    HYPOTHYROIDISM    on meds    IBS    colonoscopy    Irregular bowel habits    MIGRAINES    on inderal - Dr Mcwilliams    Migraines    Nausea    MARTIN (obstructive sleep apnea)    AHI 26.9 Supine AHI 45.2 non-supine AHI 19.9 [place] 86%    Pain with bowel movements    PONV (postoperative nausea and vomiting)    Sleep apnea    cpap    Thyroid disease    Visual impairment    glasses    Wears glasses    Weight gain   [3]   Past Surgical History:  Procedure Laterality Date    Ablation      endometrial ablation - Dr. Allen          First pregnancy, then 2     Colonoscopy      Colonoscopy,diagnostic  2/3/2011    Performed by YENI ASTORGA [facility] [place]    Mri brain      Other surgical history Bilateral     knee surgery - scope - ?? findings    Sinus surgery    2021    Tubal ligation      Post partum after last preg   [4]   Social History  Socioeconomic History    Marital status:    Tobacco Use    Smoking status: Former     Current packs/day: 0.00     Average packs/day: 1 pack/day for 20.0 years (20.0 ttl pk-yrs)     Types: Cigarettes     Start date: 1986     Quit date: 2006     Years since quittin.2     Passive exposure: Past    Smokeless tobacco: Never    Tobacco comments:     15 pk yrs   Vaping Use    Vaping status: Never Used   Substance and Sexual Activity    Alcohol use: No     Alcohol/week: 0.0 standard drinks of alcohol    Drug use: No    Sexual activity: Yes     Partners: Male     Birth control/protection: Surgical     Comment: BTL    Other Topics Concern    Caffeine Concern Yes     Comment: 1-2 cups coffee per day    Exercise No   [5]   Family  History  Problem Relation Age of Onset    Diabetes Mother     Hypertension Mother     Lipids Mother     Heart Attack Mother     Stroke Mother     Mental Disorder Mother     Other (hypothyroidism) Mother     Heart Attack Father     Other (kidney disease) Father     Ovarian Cancer Maternal Cousin Female 53    No Known Problems Daughter     No Known Problems Son     No Known Problems Sister     No Known Problems Brother     No Known Problems Daughter     No Known Problems Sister     No Known Problems Brother     No Known Problems Brother    [6]   Allergies  Allergen Reactions    Pcn [Penicillins] RASH

## 2025-04-16 NOTE — ASSESSMENT & PLAN NOTE
- diet and lifestyle modifications  - recheck labs   Orders:    Tirzepatide-Weight Management (ZEPBOUND) 2.5 MG/0.5ML Subcutaneous Solution Auto-injector; Inject 2.5 mg into the skin once a week for 4 doses.    Lipid Panel [E]; Future

## 2025-04-17 ENCOUNTER — TELEPHONE (OUTPATIENT)
Dept: INTERNAL MEDICINE CLINIC | Facility: CLINIC | Age: 55
End: 2025-04-17

## 2025-04-17 ENCOUNTER — LAB ENCOUNTER (OUTPATIENT)
Dept: LAB | Facility: HOSPITAL | Age: 55
End: 2025-04-17
Attending: INTERNAL MEDICINE
Payer: COMMERCIAL

## 2025-04-17 DIAGNOSIS — E78.2 MIXED HYPERLIPIDEMIA: ICD-10-CM

## 2025-04-17 DIAGNOSIS — E66.09 CLASS 1 OBESITY DUE TO EXCESS CALORIES WITHOUT SERIOUS COMORBIDITY WITH BODY MASS INDEX (BMI) OF 33.0 TO 33.9 IN ADULT: ICD-10-CM

## 2025-04-17 DIAGNOSIS — E66.811 CLASS 1 OBESITY DUE TO EXCESS CALORIES WITHOUT SERIOUS COMORBIDITY WITH BODY MASS INDEX (BMI) OF 33.0 TO 33.9 IN ADULT: ICD-10-CM

## 2025-04-17 LAB
ALBUMIN SERPL-MCNC: 4.8 G/DL (ref 3.2–4.8)
ALBUMIN/GLOB SERPL: 2.1 {RATIO} (ref 1–2)
ALP LIVER SERPL-CCNC: 82 U/L (ref 41–108)
ALT SERPL-CCNC: 16 U/L (ref 10–49)
ANION GAP SERPL CALC-SCNC: 8 MMOL/L (ref 0–18)
AST SERPL-CCNC: 23 U/L (ref ?–34)
ATRIAL RATE: 63 BPM
BILIRUB SERPL-MCNC: 1.1 MG/DL (ref 0.3–1.2)
BUN BLD-MCNC: 15 MG/DL (ref 9–23)
BUN/CREAT SERPL: 12.2 (ref 10–20)
CALCIUM BLD-MCNC: 9.7 MG/DL (ref 8.7–10.4)
CHLORIDE SERPL-SCNC: 106 MMOL/L (ref 98–112)
CHOLEST SERPL-MCNC: 200 MG/DL (ref ?–200)
CO2 SERPL-SCNC: 28 MMOL/L (ref 21–32)
CREAT BLD-MCNC: 1.23 MG/DL (ref 0.55–1.02)
EGFRCR SERPLBLD CKD-EPI 2021: 52 ML/MIN/1.73M2 (ref 60–?)
EST. AVERAGE GLUCOSE BLD GHB EST-MCNC: 108 MG/DL (ref 68–126)
FASTING PATIENT LIPID ANSWER: YES
FASTING STATUS PATIENT QL REPORTED: YES
GLOBULIN PLAS-MCNC: 2.3 G/DL (ref 2–3.5)
GLUCOSE BLD-MCNC: 85 MG/DL (ref 70–99)
HBA1C MFR BLD: 5.4 % (ref ?–5.7)
HDLC SERPL-MCNC: 46 MG/DL (ref 40–59)
LDLC SERPL CALC-MCNC: 127 MG/DL (ref ?–100)
NONHDLC SERPL-MCNC: 154 MG/DL (ref ?–130)
OSMOLALITY SERPL CALC.SUM OF ELEC: 294 MOSM/KG (ref 275–295)
P AXIS: 48 DEGREES
P-R INTERVAL: 190 MS
POTASSIUM SERPL-SCNC: 4.3 MMOL/L (ref 3.5–5.1)
PROT SERPL-MCNC: 7.1 G/DL (ref 5.7–8.2)
Q-T INTERVAL: 398 MS
QRS DURATION: 70 MS
QTC CALCULATION (BEZET): 407 MS
R AXIS: 16 DEGREES
SODIUM SERPL-SCNC: 142 MMOL/L (ref 136–145)
T AXIS: 27 DEGREES
TRIGL SERPL-MCNC: 152 MG/DL (ref 30–149)
VENTRICULAR RATE: 63 BPM
VLDLC SERPL CALC-MCNC: 27 MG/DL (ref 0–30)

## 2025-04-17 PROCEDURE — 83036 HEMOGLOBIN GLYCOSYLATED A1C: CPT

## 2025-04-17 PROCEDURE — 93005 ELECTROCARDIOGRAM TRACING: CPT

## 2025-04-17 PROCEDURE — 80061 LIPID PANEL: CPT

## 2025-04-17 PROCEDURE — 80053 COMPREHEN METABOLIC PANEL: CPT

## 2025-04-17 PROCEDURE — 93010 ELECTROCARDIOGRAM REPORT: CPT | Performed by: INTERNAL MEDICINE

## 2025-04-17 PROCEDURE — 36415 COLL VENOUS BLD VENIPUNCTURE: CPT

## 2025-04-17 NOTE — TELEPHONE ENCOUNTER
Awaiting official denial    Denied    Note from payer: CaseId:[id number];Status:Denied;Review Type:Prior Auth;Appeal Information: Attention:ATTN:  CLINICAL APPEALS DEPARTMENT [employer] [address] [address],[place], [place],[address] Phone:[phone] Fax:[phone]; Important - Please read the below note on eAppeals: Please reference the denial letter for information on the rights for an appeal, rationale for the denial, and how to submit an appeal including if any information is needed to support the appeal. Note about urgent situations - Generally, an urgent situation is one which, in the opinion of the provider, the health of the patient may be in serious jeopardy or may experience pain that cannot be adequately controlled while waiting for a decision on the appeal.;  Payer: [employer] Case ID: [id number]    [phone]  Electronic appeal: Supported

## 2025-04-18 NOTE — TELEPHONE ENCOUNTER
Denied for note meeting new criteria for 2025    New criteria 2025:  BMI must be over 40 OR 35 with 2 comorbidities

## 2025-04-21 ENCOUNTER — TELEPHONE (OUTPATIENT)
Dept: INTERNAL MEDICINE CLINIC | Facility: CLINIC | Age: 55
End: 2025-04-21

## 2025-04-21 DIAGNOSIS — E66.09 CLASS 1 OBESITY DUE TO EXCESS CALORIES WITHOUT SERIOUS COMORBIDITY WITH BODY MASS INDEX (BMI) OF 33.0 TO 33.9 IN ADULT: Primary | ICD-10-CM

## 2025-04-21 DIAGNOSIS — E66.811 CLASS 1 OBESITY DUE TO EXCESS CALORIES WITHOUT SERIOUS COMORBIDITY WITH BODY MASS INDEX (BMI) OF 33.0 TO 33.9 IN ADULT: Primary | ICD-10-CM

## 2025-04-21 RX ORDER — PHENTERMINE HYDROCHLORIDE 15 MG/1
15 CAPSULE ORAL EVERY MORNING
Qty: 30 CAPSULE | Refills: 0 | Status: SHIPPED | OUTPATIENT
Start: 2025-04-21

## 2025-04-22 ENCOUNTER — TELEPHONE (OUTPATIENT)
Dept: INTERNAL MEDICINE CLINIC | Facility: CLINIC | Age: 55
End: 2025-04-22

## 2025-04-22 NOTE — TELEPHONE ENCOUNTER
Approved    Prior authorization approved  Payer: [employer] Case ID: [id number]    [phone]    [phone]  Note from payer: CaseId:[id number];Status:Approved;Review Type:Prior Auth;Coverage Start Date:03/23/2025;Coverage End Date:07/21/2025;  Approval Details    Authorized from March 23, 2025 to July 21, 2025  Electronic appeal: Not supported

## 2025-05-21 DIAGNOSIS — E78.2 MIXED HYPERLIPIDEMIA: ICD-10-CM

## 2025-05-22 RX ORDER — ATORVASTATIN CALCIUM 40 MG/1
40 TABLET, FILM COATED ORAL
Qty: 90 TABLET | Refills: 3 | Status: SHIPPED | OUTPATIENT
Start: 2025-05-22

## 2025-05-23 ENCOUNTER — HOSPITAL ENCOUNTER (OUTPATIENT)
Age: 55
Discharge: HOME | End: 2025-05-23
Attending: EMERGENCY MEDICINE
Payer: COMMERCIAL

## 2025-05-23 VITALS
TEMPERATURE: 98 F | SYSTOLIC BLOOD PRESSURE: 143 MMHG | DIASTOLIC BLOOD PRESSURE: 82 MMHG | RESPIRATION RATE: 12 BRPM | OXYGEN SATURATION: 100 % | HEART RATE: 73 BPM

## 2025-05-23 DIAGNOSIS — J06.9 VIRAL URI: Primary | ICD-10-CM

## 2025-05-23 LAB
S PYO AG THROAT QL IA.RAPID: NEGATIVE
SARS-COV-2 RNA RESP QL NAA+PROBE: NOT DETECTED

## 2025-05-23 PROCEDURE — 99213 OFFICE O/P EST LOW 20 MIN: CPT

## 2025-05-23 PROCEDURE — 99212 OFFICE O/P EST SF 10 MIN: CPT

## 2025-05-23 PROCEDURE — 87651 STREP A DNA AMP PROBE: CPT | Performed by: EMERGENCY MEDICINE

## 2025-05-23 NOTE — ED PROVIDER NOTES
Patient Seen in: [facility] [place]        History  Chief Complaint   Patient presents with    Sinus Problem     Stated Complaint: sinus infection    Subjective:   HPI    Patient is a 55-year-old female with past history of migraines, hyperlipidemia who presents now with nasal congestion, sore throat.  The patient states the symptoms started approximately 3 days ago.  The patient believes she may have had a fever the first few days, but denies any fever at this time.  Patient describes nasal congestion and drainage which was initially clear but is now somewhat \"green\".  Patient has had a persistent sore throat.  The patient denies any significant cough.      Objective:     Past Medical History:    Abdominal distention    Abdominal pain    Anxiety state    Bloating    Blood in the stool    Body piercing    Constipation    Disorder of thyroid    Diverticulosis    Flatulence/gas pain/belching    Frequent use of laxatives    GERD (gastroesophageal reflux disease)    on meds    Heartburn    Hematometra    History of depression    Hypercholesteremia    on simvastatin in past, new med     HYPOTHYROIDISM    on meds    IBS    colonoscopy    Irregular bowel habits    MIGRAINES    on inderal - Dr Mcwilliams    Migraines    Nausea    MARTIN (obstructive sleep apnea)    AHI 26.9 Supine AHI 45.2 non-supine AHI 19.9 [place] 86%    Pain with bowel movements    PONV (postoperative nausea and vomiting)    Sleep apnea    cpap    Thyroid disease    Visual impairment    glasses    Wears glasses    Weight gain              Past Surgical History:   Procedure Laterality Date    Ablation      endometrial ablation - Dr. Allen          First pregnancy, then 2     Colonoscopy      Colonoscopy,diagnostic  2/3/2011    Performed by YENI ASTORGA at [facility], [facility]    Mri brain      Other surgical history Bilateral     knee surgery - scope - ?? findings    Sinus surgery    2021    Tubal ligation       Post partum after last preg                Social History     Socioeconomic History    Marital status:    Tobacco Use    Smoking status: Former     Current packs/day: 0.00     Average packs/day: 1 pack/day for 20.0 years (20.0 ttl pk-yrs)     Types: Cigarettes     Start date: 1986     Quit date: 2006     Years since quittin.3     Passive exposure: Past    Smokeless tobacco: Never    Tobacco comments:     15 pk yrs   Vaping Use    Vaping status: Never Used   Substance and Sexual Activity    Alcohol use: No     Alcohol/week: 0.0 standard drinks of alcohol    Drug use: No    Sexual activity: Yes     Partners: Male     Birth control/protection: Surgical     Comment: BTL    Other Topics Concern    Caffeine Concern Yes     Comment: 1-2 cups coffee per day    Exercise No              Review of Systems    Positive for stated complaint: sinus infection  Other systems are as noted in HPI.  Constitutional and vital signs reviewed.      All other systems reviewed and negative except as noted above.                  Physical Exam    ED Triage Vitals [25 1257]   /82   Pulse 73   Resp 12   Temp 98 °F (36.7 °C)   Temp src Oral   SpO2 100 %   O2 Device None (Room air)       Current Vitals:   Vital Signs  BP: 143/82  Pulse: 73  Resp: 12  Temp: 98 °F (36.7 °C)  Temp src: Oral    Oxygen Therapy  SpO2: 100 %  O2 Device: None (Room air)            Physical Exam    Constitutional: Well-developed well-nourished in no acute distress  Head: Normocephalic, no swelling or tenderness  Eyes: Nonicteric sclera, no conjunctival injection  ENT: TMs are clear and flat bilaterally.  There is mild posterior pharyngeal erythema  Chest: Clear to auscultation, no tenderness  Cardiovascular: Regular rate and rhythm without murmur  Abdomen: Soft, nontender and nondistended  Neurologic: Patient is awake, alert and oriented ×3.  The patient's motor strength is 5 out of 5 and symmetric in the upper and lower  extremities bilaterally  Extremities: No focal swelling or tenderness  Skin: No pallor, no redness or warmth to the touch          ED Course  Labs Reviewed   RAPID SARS-COV-2 BY PCR - Normal   RAPID STREP A - Normal          Pulse ox is 100% on room air, normal.  Vital signs are stable        Patient's negative COVID, negative strep were reviewed with the patient.  Probable viral etiology of the patient's symptoms.  Recommend Flonase for nasal congestion.  Motrin/Tylenol for pain or fever          MDM     Viral URI versus strep throat versus COVID        Medical Decision Making      Disposition and Plan     Clinical Impression:  1. Viral URI         Disposition:  Discharge  5/23/2025  1:24 pm    Follow-up:  Eyal Solitario, DO  [address]  [address] [place] [address] [phone]      As needed          Medications Prescribed:  Current Discharge Medication List                Supplementary Documentation:

## 2025-05-23 NOTE — ED INITIAL ASSESSMENT (HPI)
Patient states she is concerned for a sinus infection.  Reports 2 day hx of stuffy nose, sore throat, swollen glands.  Reports similar symptoms with sinus infections in the past.

## 2025-05-23 NOTE — DISCHARGE INSTRUCTIONS
Flonase for nasal congestion.  Motrin and Tylenol for any pain or fever.  Follow-up for any worsening symptoms

## 2025-06-16 ENCOUNTER — OFFICE VISIT (OUTPATIENT)
Dept: INTERNAL MEDICINE CLINIC | Facility: CLINIC | Age: 55
End: 2025-06-16
Payer: COMMERCIAL

## 2025-06-16 VITALS
DIASTOLIC BLOOD PRESSURE: 79 MMHG | HEIGHT: 67 IN | HEART RATE: 67 BPM | BODY MASS INDEX: 29.82 KG/M2 | SYSTOLIC BLOOD PRESSURE: 129 MMHG | WEIGHT: 190 LBS

## 2025-06-16 DIAGNOSIS — E66.09 CLASS 1 OBESITY DUE TO EXCESS CALORIES WITHOUT SERIOUS COMORBIDITY WITH BODY MASS INDEX (BMI) OF 33.0 TO 33.9 IN ADULT: Primary | ICD-10-CM

## 2025-06-16 DIAGNOSIS — E66.811 CLASS 1 OBESITY DUE TO EXCESS CALORIES WITHOUT SERIOUS COMORBIDITY WITH BODY MASS INDEX (BMI) OF 33.0 TO 33.9 IN ADULT: Primary | ICD-10-CM

## 2025-06-16 PROCEDURE — 99213 OFFICE O/P EST LOW 20 MIN: CPT | Performed by: INTERNAL MEDICINE

## 2025-06-16 RX ORDER — PHENTERMINE HYDROCHLORIDE 30 MG/1
30 CAPSULE ORAL EVERY MORNING
Qty: 30 CAPSULE | Refills: 0 | Status: SHIPPED | OUTPATIENT
Start: 2025-06-16

## 2025-06-16 NOTE — PROGRESS NOTES
Key Song is a 55 year old female.  Chief Complaint   Patient presents with    Weight Management     HPI:     The following individual(s) verbally consented to be recorded using ambient AI listening technology and understand that they can each withdraw their consent to this listening technology at any point by asking the clinician to turn off or pause the recording:    Patient name: Key Song is a 55 year old female who presents for medication management of weight control.    She takes phentermine 15 mg daily between 5:30 and 6:00 AM, which helps significantly in the morning but is less effective in the evening. No side effects such as dizziness. Previously, she used injectables that were more effective, but they were discontinued due to insurance coverage issues related to BMI requirements.    She is not a big breakfast eater due to her thyroid medication, which requires her to wait an hour before eating. Typically, she eats lunch around 11:30 AM to 12:00 PM. Cravings tend to increase after 5:00 PM, particularly after dinner.    In terms of physical activity, she reports increased activity at work due to the lack of a printer at her desk, requiring her to walk more. Additionally, she is more active at home, especially during the summer, as she spends time outside and is active with her grandchildren. She also mentions having a pool, which contributes to her activity level.    Her highest weight was 220 pounds, and she feels best when her weight is between 160 and 170 pounds. She is currently focused on maintaining a healthy diet, emphasizing protein intake and reducing carbohydrates.         Current Medications[1]   Past Medical History[2]   Past Surgical History[3]   Social History:  Short Social Hx on File[4]   Family History[5]   Allergies[6]     REVIEW OF SYSTEMS:   Review of Systems   Review of Systems   Constitutional: Negative for activity change, appetite change and  fever.   HENT: Negative for congestion and voice change.    Respiratory: Negative for cough and shortness of breath.    Cardiovascular: Negative for chest pain.   Gastrointestinal: Negative for abdominal distention, abdominal pain and vomiting.   Genitourinary: Negative for hematuria.   Skin: Negative for wound.   Psychiatric/Behavioral: Negative for behavioral problems.   Wt Readings from Last 5 Encounters:   06/16/25 190 lb (86.2 kg)   04/16/25 186 lb (84.4 kg)   02/25/25 185 lb (83.9 kg)   12/23/24 185 lb 1.6 oz (84 kg)   12/02/24 180 lb (81.6 kg)     Body mass index is 29.76 kg/m².      EXAM:   /79   Pulse 67   Ht 5' 7\" (1.702 m)   Wt 190 lb (86.2 kg)   BMI 29.76 kg/m²   Physical Exam   Constitutional:       Appearance: Normal appearance.   HENT:      Head: Normocephalic.   Eyes:      Conjunctiva/sclera: Conjunctivae normal.   Breast:  Normal bilateral breast exam. No palpable masses or nodules.   No nipples asymmetry or discharge. No skin changes   Cardiovascular:      Rate and Rhythm: Normal rate and regular rhythm.      Heart sounds: Normal heart sounds. No murmur heard.  Pulmonary:      Effort: Pulmonary effort is normal.      Breath sounds: Normal breath sounds. No rhonchi or rales.   Abdominal:      General: Bowel sounds are normal.      Palpations: Abdomen is soft.      Tenderness: There is no abdominal tenderness.   Musculoskeletal:      Cervical back: Neck supple.      Right lower leg: No edema.      Left lower leg: No edema.   Skin:     General: Skin is warm and dry.   Neurological:      General: No focal deficit present.      Mental Status: He is alert and oriented to person, place, and time. Mental status is at baseline.   Psychiatric:         Mood and Affect: Mood normal.         Behavior: Behavior normal.       ASSESSMENT AND PLAN:     Assessment & Plan  Class 1 obesity due to excess calories without serious comorbidity with body mass index (BMI) of 33.0 to 33.9 in adult  She experiences  difficulty with weight management, particularly in the evening. Phentermine 15 mg aids daytime cravings but not evening ones. Insurance coverage for weight management medications is limited due to BMI requirements. Increasing phentermine to 30 mg may help manage evening cravings. Taking the medication at 10 AM is advised to avoid sleep disturbances. Cash pay options for weight management medication are available through online [facility] pharmacy, approximately $500 per month. She feels better when her weight is between 160 and 170 pounds, compared to her highest weight of 220 pounds.  - Increase phentermine to 30 mg daily.  - Advise taking phentermine at 10 AM to manage evening cravings and avoid sleep disturbances.  - Discuss cash pay option for weight management medication through online [facility] pharmacy, approximately $500 per month. Will try next year once husbands new job begins   - Send prescription to [facility].  - Instruct to send a Esp[employer] message 3-4 days before running out of medication for refill.  She engages in increased physical activity due to work-related walking and activities with her grandchildren. Emphasis on a diet high in protein and low in carbohydrates is recommended to support weight management.  - Encourage continued physical activity, including walking at work and activities with grandchildren.  - Advise focusing on a diet high in protein and low in carbohydrates.       Orders:    Phentermine HCl 30 MG Oral Cap; Take 1 capsule (30 mg total) by mouth every morning.     The patient indicates understanding of these issues and agrees to the plan.      Tere Cardoso MD     This note was created by [employer] voice recognition. Errors in content may be related to improper recognition by the system; efforts to review and correct have been done but errors may still exist. Please be advised the primary purpose of this note is for me to communicate medical care. Standard sentence structure is  not always used. Medical terminology and medical abbreviations may be used. There may be grammatical, typographical, and automated fill ins that may have errors missed in proofreading.        [1]   Current Outpatient Medications   Medication Sig Dispense Refill    Phentermine HCl 30 MG Oral Cap Take 1 capsule (30 mg total) by mouth every morning. 30 capsule 0    atorvastatin 40 MG Oral Tab Take 1 tablet (40 mg total) by mouth in the morning. 90 tablet 3    levothyroxine 75 MCG Oral Tab Take 1 tablet (75 mcg total) by mouth before breakfast. 90 tablet 1    Galcanezumab-gnlm (EMGALITY) 120 MG/ML Subcutaneous Solution Auto-injector every 30 days with maintenance dose of 120mg. 1 each 11    levothyroxine 88 MCG Oral Tab Take 1 tablet (88 mcg total) by mouth before breakfast. 90 tablet 1    benzonatate 200 MG Oral Cap Take 1 capsule (200 mg total) by mouth 3 (three) times daily as needed for cough. (Patient not taking: Reported on 2025) 30 capsule 0   [2]   Past Medical History:   Abdominal distention    Abdominal pain    Anxiety state    Bloating    Blood in the stool    Body piercing    Constipation    Disorder of thyroid    Diverticulosis    Flatulence/gas pain/belching    Frequent use of laxatives    GERD (gastroesophageal reflux disease)    on meds    Heartburn    Hematometra    History of depression    Hypercholesteremia    on simvastatin in past, new med     HYPOTHYROIDISM    on meds    IBS    colonoscopy    Irregular bowel habits    MIGRAINES    on inderal - Dr Mcwilliams    Migraines    Nausea    MARTIN (obstructive sleep apnea)    AHI 26.9 Supine AHI 45.2 non-supine AHI 19.9 Sao2 [place] 86%    Pain with bowel movements    PONV (postoperative nausea and vomiting)    Sleep apnea    cpap    Thyroid disease    Visual impairment    glasses    Wears glasses    Weight gain   [3]   Past Surgical History:  Procedure Laterality Date    Ablation      endometrial ablation - Dr. Allen          First  pregnancy, then 2     Colonoscopy      Colonoscopy,diagnostic  2/3/2011    Performed by YENI ASTORGA at [facility] [place]    Mri brain      Other surgical history Bilateral     knee surgery - scope - ?? findings    Sinus surgery    2021    Tubal ligation      Post partum after last preg   [4]   Social History  Socioeconomic History    Marital status:    Tobacco Use    Smoking status: Former     Current packs/day: 0.00     Average packs/day: 1 pack/day for 20.0 years (20.0 ttl pk-yrs)     Types: Cigarettes     Start date: 1986     Quit date: 2006     Years since quittin.4     Passive exposure: Past    Smokeless tobacco: Never    Tobacco comments:     15 pk yrs   Vaping Use    Vaping status: Never Used   Substance and Sexual Activity    Alcohol use: No     Alcohol/week: 0.0 standard drinks of alcohol    Drug use: No    Sexual activity: Yes     Partners: Male     Birth control/protection: Surgical     Comment: BTL    Other Topics Concern    Caffeine Concern Yes     Comment: 1-2 cups coffee per day    Exercise No   [5]   Family History  Problem Relation Age of Onset    Diabetes Mother     Hypertension Mother     Lipids Mother     Heart Attack Mother     Stroke Mother     Mental Disorder Mother     Other (hypothyroidism) Mother     Heart Attack Father     Other (kidney disease) Father     Ovarian Cancer Maternal Cousin Female 53    No Known Problems Daughter     No Known Problems Son     No Known Problems Sister     No Known Problems Brother     No Known Problems Daughter     No Known Problems Sister     No Known Problems Brother     No Known Problems Brother    [6]   Allergies  Allergen Reactions    Pcn [Penicillins] RASH

## 2025-07-02 DIAGNOSIS — E03.9 HYPOTHYROIDISM, UNSPECIFIED TYPE: ICD-10-CM

## 2025-07-04 RX ORDER — LEVOTHYROXINE SODIUM 88 UG/1
88 TABLET ORAL
Qty: 90 TABLET | Refills: 1 | Status: SHIPPED | OUTPATIENT
Start: 2025-07-04

## 2025-07-04 NOTE — TELEPHONE ENCOUNTER
Refill passed per [facility] protocol.    Requested Prescriptions   Pending Prescriptions Disp Refills    LEVOTHYROXINE 88 MCG Oral Tab [Pharmacy Med Name: LEVOTHYROXINE 88 MCG TABLET] 90 tablet 1     Sig: TAKE 1 TABLET BY MOUTH BEFORE BREAKFAST.       Thyroid Medication Protocol Passed - 7/4/2025 10:44 AM        Passed - TSH in past 12 months        Passed - Last TSH value is normal     Lab Results   Component Value Date    TSH 2.616 03/24/2025                 Passed - In person appointment or virtual visit in the past 12 mos or appointment in next 3 mos     Recent Outpatient Visits              2 weeks ago Class 1 obesity due to excess calories without serious comorbidity with body mass index (BMI) of 33.0 to 33.9 in adult    [facility] [place] Kagzi, Yasmin Irfan, MD    Office Visit    2 months ago Class 1 obesity due to excess calories without serious comorbidity with body mass index (BMI) of 33.0 to 33.9 in adult    [facility] [place] Kagzi, Yasmin Irfan, MD    Office Visit    4 months ago Chronic migraine without aura without status migrainosus, not intractable    [facility]    [facility]    6 months ago [facility]-recurrent [facility]    [facility]    7 months ago Acute non-recurrent sinusitis[facility]    [facility] Visit                      Passed - Medication is active on med list             Recent Outpatient Visits              2 weeks ago Class 1 obesity due to excess calories without serious comorbidity with body mass index (BMI) of 33.0 to 33.9 in adult    [facility] [place] Kagzi, Yasmin Irfan, MD    Office Visit    2 months ago Class 1  obesity due to excess calories without serious comorbidity with body mass index (BMI) of 33.0 to 33.9 in adult    [facility], [place] Tere Cardoso MD    Office Visit    4 months ago Chronic migraine without aura without status migrainosus, not intractable    [facility], Tamie Medel MD    Office Visit    6 months ago [facility] non-recurrent sinus[facility]    [facility] Visit    7 months ago Acute non-recurrent sinusitis, unspecified location    [facility], [address], [place] Amie Cuenca APRN    Office Visit

## 2025-07-28 DIAGNOSIS — E66.09 CLASS 1 OBESITY DUE TO EXCESS CALORIES WITHOUT SERIOUS COMORBIDITY WITH BODY MASS INDEX (BMI) OF 33.0 TO 33.9 IN ADULT: ICD-10-CM

## 2025-07-28 DIAGNOSIS — E66.811 CLASS 1 OBESITY DUE TO EXCESS CALORIES WITHOUT SERIOUS COMORBIDITY WITH BODY MASS INDEX (BMI) OF 33.0 TO 33.9 IN ADULT: ICD-10-CM

## 2025-07-31 ENCOUNTER — TELEPHONE (OUTPATIENT)
Dept: INTERNAL MEDICINE CLINIC | Facility: CLINIC | Age: 55
End: 2025-07-31

## 2025-07-31 RX ORDER — PHENTERMINE HYDROCHLORIDE 30 MG/1
30 CAPSULE ORAL EVERY MORNING
Qty: 30 CAPSULE | Refills: 0 | Status: SHIPPED | OUTPATIENT
Start: 2025-07-31

## (undated) DEVICE — SINUS CDS: Brand: MEDLINE INDUSTRIES, INC.

## (undated) DEVICE — SCD SLEEVE KNEE HI BLEND

## (undated) DEVICE — NEEDLE SPINAL 25X3-1/2 BLUE

## (undated) DEVICE — STERILE POLYISOPRENE POWDER-FREE SURGICAL GLOVES: Brand: PROTEXIS

## (undated) DEVICE — SPECIMEN TRAP

## (undated) DEVICE — BLADE 1884080EM TRICUT 4MMX13CM M4 ROHS: Brand: FUSION®

## (undated) DEVICE — GOWN,SIRUS,FABRIC-REINFORCED,X-LARGE: Brand: MEDLINE

## (undated) DEVICE — PATIENT TRACKER 9734887XOM NON-INVASIVE

## (undated) DEVICE — TUBING 1895522 5PK STRAIGHTSHOT TO XPS: Brand: STRAIGHTSHOT®

## (undated) DEVICE — POSITIONER OR 9X8X4.5IN NLTX

## (undated) DEVICE — SOL  .9 1000ML BTL

## (undated) DEVICE — INSTRUMENT TRACKER 9733533XOM ENT 1PK

## (undated) DEVICE — NASAL PACKING CS3600-10 NOVAPAK 10PK STD: Brand: NOVAPAK

## (undated) NOTE — LETTER
June 13, 2018      [address]  [address] [facility]        Dear Layman Alexander: We recently released test results for you via [employer] and we noticed you have not yet viewed them.     Please log into [employer] to view the results and con

## (undated) NOTE — LETTER
Amie Cuenca, Aprn  [address] [place] [address]       07/02/24        Patient: Key Song   YOB: 1970   Date of Visit: 7/2/2024       Dear  Dr. Cassi MD,      Thank you for referring Key Song to my practice.  Please find my assessment and plan below.      As you know she is a 54-year-old female with a history of hypothyroidism hypercholesterolemia and a long-term history of severe migraine headaches who I now the pleasure of seeing for evaluation of chronic kidney disease stage III.  Laboratory studies have been reviewed in care everywhere and in epic.  Creatinine was borderline elevated at 1.13 back in November 2019.  Was 1.12 in March 2021 but was better at 0.91 in March 2022.  [place] in January 2024 creatinine was 1.13 and on June 5, 2024 creatinine was up to 1.27 with an estimated GFR of 51 cc/min and renal consultation has now been advised.    Her past medical history is significant for hypothyroidism, hypercholesterolemia and obesity.  She has had a long-term history of difficult to control migraine headaches dating back to her 20s.  She often would get daily headaches and would use nonsteroidal anti-inflammatory agents on a daily basis.  Approximately 2 years ago she was started on Emgality which has markedly improved her migraine headaches.  Still uses Excedrin Migraine but only once or twice a month.    Social history quit smoking cigarettes in 2006.  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  Family history is notable for coronary artery disease and diabetes.  She has an aunt who was on dialysis.  Her father and an uncle have chronic kidney disease but not on dialysis.  Further details not clear.  Review of system patient otherwise states she is doing well without any chest pain, shortness of breath, GI or urinary tract symptoms.  10 point review of systems is otherwise unremarkable.    Physical exam her blood pressure 120/69 with a pulse 65 she  weighed 213 pounds.  Her neck was supple without JVD.  Lungs were clear.  Heart revealed a regular rate and rhythm without gallops, murmurs or rubs.  Abdomen was soft, flat, nontender without organomegaly, masses or bruits.  Extremities revealed no clubbing, cyanosis or edema.    I therefore informed the patient that she does appear to have chronic kidney disease stage III.  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  Other causes need to be excluded.  For completion sake a repeat CBC, renal panel, urinalysis, urine for microalbumin, urine for Bence-Flanagan protein, sed rate, connective tissue profile and a renal ultrasound have been ordered.  Further impressions and recommendations will be forthcoming after reviewing the above.  Reinforced importance of maintaining adequate hydration and try to avoid any further use of nonsteroidals.    Thank you very much for allowing me to participate in the care of your patient.  If you have any questions please feel free to call.        Sincerely,   Ildefonso Aguilar MD   [facility], [facility], [place]  [address]  [facility] [id number]    Document electronically generated by:  Ildefonso Aguilar MD

## (undated) NOTE — Clinical Note
April 19, 2017    [address]  [address] [facility]      Dear Rae Quintanilla:    Your results were reviewed by Dr. Alexander Caputo with the following comments:  Results reviewed; Tests show no significant abnormalities.      The following are the